# Patient Record
Sex: MALE | Race: WHITE | NOT HISPANIC OR LATINO | Employment: OTHER | URBAN - METROPOLITAN AREA
[De-identification: names, ages, dates, MRNs, and addresses within clinical notes are randomized per-mention and may not be internally consistent; named-entity substitution may affect disease eponyms.]

---

## 2017-06-14 ENCOUNTER — OFFICE VISIT (OUTPATIENT)
Dept: SPORTS MEDICINE | Facility: CLINIC | Age: 47
End: 2017-06-14
Payer: COMMERCIAL

## 2017-06-14 ENCOUNTER — HOSPITAL ENCOUNTER (OUTPATIENT)
Dept: RADIOLOGY | Facility: HOSPITAL | Age: 47
Discharge: HOME | End: 2017-06-14
Attending: ORTHOPAEDIC SURGERY
Payer: COMMERCIAL

## 2017-06-14 VITALS
HEART RATE: 49 BPM | SYSTOLIC BLOOD PRESSURE: 123 MMHG | WEIGHT: 175 LBS | HEIGHT: 72 IN | DIASTOLIC BLOOD PRESSURE: 70 MMHG | BODY MASS INDEX: 23.7 KG/M2

## 2017-06-14 DIAGNOSIS — M22.41 CHONDROMALACIA OF BOTH PATELLAE: ICD-10-CM

## 2017-06-14 DIAGNOSIS — M22.42 CHONDROMALACIA OF BOTH PATELLAE: ICD-10-CM

## 2017-06-14 DIAGNOSIS — M25.562 LEFT KNEE PAIN, UNSPECIFIED CHRONICITY: ICD-10-CM

## 2017-06-14 DIAGNOSIS — M25.562 LEFT KNEE PAIN, UNSPECIFIED CHRONICITY: Primary | ICD-10-CM

## 2017-06-14 PROCEDURE — 99203 OFFICE O/P NEW LOW 30 MIN: CPT | Mod: S$GLB,,, | Performed by: ORTHOPAEDIC SURGERY

## 2017-06-14 PROCEDURE — 73564 X-RAY EXAM KNEE 4 OR MORE: CPT | Mod: 26,50,, | Performed by: RADIOLOGY

## 2017-06-14 PROCEDURE — 73564 X-RAY EXAM KNEE 4 OR MORE: CPT | Mod: TC,50,PO

## 2017-06-14 PROCEDURE — 99999 PR PBB SHADOW E&M-NEW PATIENT-LVL IV: CPT | Mod: PBBFAC,,, | Performed by: ORTHOPAEDIC SURGERY

## 2017-06-14 RX ORDER — MELOXICAM 15 MG/1
15 TABLET ORAL DAILY
Qty: 30 TABLET | Refills: 2 | Status: SHIPPED | OUTPATIENT
Start: 2017-06-14 | End: 2018-04-26

## 2017-06-14 NOTE — PROGRESS NOTES
CC: Left knee pain    46 y.o. Male with a history of left knee pain who has had pain for a few years.  He states that the pain is severe and not responding to any conservative care.      He reports that the pain and weakness. It also bothers him at night. Works in a machine shop.      - mechanical symptoms, - instability    Is affecting ADLs.  Pain is 10/10 at it's worst.    No history of injections    REVIEW OF SYSTEMS:  Constitution: Negative. Negative for chills, fever and night sweats.   HENT: Negative for congestion and headaches.    Eyes: Negative for blurred vision, left vision loss and right vision loss.   Cardiovascular: Negative for chest pain and syncope.   Respiratory: Negative for cough and shortness of breath.    Endocrine: Negative for polydipsia, polyphagia and polyuria.   Hematologic/Lymphatic: Negative for bleeding problem. Does not bruise/bleed easily.   Skin: Negative for dry skin, itching and rash.   Musculoskeletal: Negative for falls. Positive for left knee pain and  muscle weakness.   Gastrointestinal: Negative for abdominal pain and bowel incontinence.   Genitourinary: Negative for bladder incontinence and nocturia.   Neurological: Negative for disturbances in coordination, loss of balance and seizures.   Psychiatric/Behavioral: Negative for depression. The patient does not have insomnia.    Allergic/Immunologic: Negative for hives and persistent infections.     PAST MEDICAL HISTORY:    Past Medical History:   Diagnosis Date    Allergy     Hyperlipidemia        PAST SURGICAL HISTORY:   Past Surgical History:   Procedure Laterality Date    SINUS SURGERY         FAMILY HISTORY:   Family History   Problem Relation Age of Onset    Arthritis Mother     Heart disease Maternal Grandmother     Heart disease Maternal Grandfather     Heart disease Paternal Grandmother     Heart disease Paternal Grandfather        SOCIAL HISTORY:   Social History     Social History    Marital status: Single      Spouse name: N/A    Number of children: N/A    Years of education: N/A     Occupational History    Not on file.     Social History Main Topics    Smoking status: Never Smoker    Smokeless tobacco: Not on file    Alcohol use 0.5 oz/week     1 Standard drinks or equivalent per week      Comment: very rarely    Drug use: No    Sexual activity: Yes     Partners: Female     Birth control/ protection: Condom     Other Topics Concern    Not on file     Social History Narrative    No narrative on file       MEDICATIONS:     Current Outpatient Prescriptions:     loratadine (CLARITIN) 10 mg tablet, Take 10 mg by mouth once daily.  , Disp: , Rfl:     ALLERGIES:   Review of patient's allergies indicates:  No Known Allergies    VITAL SIGNS:   /70   Pulse (!) 49   Ht 6' (1.829 m)   Wt 79.4 kg (175 lb)   BMI 23.73 kg/m²      PHYSICAL EXAMINATION  General:  The patient is alert and oriented x 3.  Mood is pleasant.  Observation of ears, eyes and nose reveal no gross abnormalities.  No labored breathing observed.    LEFT KNEE EXAMINATION     OBSERVATION / INSPECTION   Gait:   Nonantalgic   Alignment:  Neutral   Scars:   None   Muscle atrophy: Mild  Effusion:  None   Warmth:  None   Discoloration:   none     TENDERNESS / CREPITUS (T / C):          T / C      T / C   Patella   - / -   Lateral joint line   - / -   Peripatellar medial  -  Medial joint line    - / -   Peripatellar lateral -  Medial plica   - / -   Patellar tendon -   Popliteal fossa  - / -   Quad tendon   -   Gastrocnemius   -   Prepatellar Bursa - / -   Quadricep   -   Tibial tubercle  -  Thigh/hamstring  -   Pes anserine/HS -  Fibula    -   ITB   - / -  Tibia     -   Tib/fib joint  - / -  LCL    -     MFC   - / -   MCL: Proximal  -    LFC   - / -    Distal   -          ROM: (* = pain)  PASSIVE   ACTIVE    Left :   5 / 0 / 145   5 / 0 / 145     Right :    5 / 0 / 145   5 / 0 / 145    Patellofemoral examination:  See above noted areas of  tenderness.   Patella position    Subluxation / dislocation: Centered           Sup. / Inf;   Normal   Crepitus (PF):    Absent   Patellar Mobility:       Medial-lateral:   Normal    Superior-inferior:  Normal    Inferior tilt   Normal    Patellar tendon:  Normal   Lateral tilt:    Normal   J-sign:     None   Patellofemoral grind:   No pain       MENISCAL SIGNS:     Pain on terminal extension:  -  Pain on terminal flexion:  -  Geoffs maneuver:  - (for pain)  Squat     - (for pain)    LIGAMENT EXAMINATION:  ACL / Lachman:  normal (-1 to 2mm)    PCL-Post.  drawer: normal 0 to 2mm  MCL- Valgus:  normal 0 to 2mm  LCL- Varus:  normal 0 to 2mm  Pivot shift: normal (Equal)   Dial Test: difference c/w other side   At 30° flexion: normal (< 5°)    At 90° flexion: normal (< 5°)   Reverse Pivot Shift:   normal (Equal)     STRENGTH: (* = with pain) PAINFUL SIDE   Quadricep   5/5   Hamstrin/5    EXTREMITY NEURO-VASCULAR EXAMINATION:   Sensation:  Grossly intact to light touch all dermatomal regions.   Motor Function:  Fully intact motor function at hip, knee, foot and ankle    DTRs;  quadriceps and  achilles 2+.  No clonus and downgoing Babinski.    Vascular status:  DP and PT pulses 2+, brisk capillary refill, symmetric.     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 Mobic  2. PT  3. RTC 2-3mo. If no improvements will consider synvisc injection    All questions were answered, pt will contact us for questions or concerns in the interim.

## 2017-07-05 ENCOUNTER — CLINICAL SUPPORT (OUTPATIENT)
Dept: REHABILITATION | Facility: HOSPITAL | Age: 47
End: 2017-07-05
Attending: ORTHOPAEDIC SURGERY
Payer: COMMERCIAL

## 2017-07-05 DIAGNOSIS — M25.562 CHRONIC PAIN OF LEFT KNEE: Primary | ICD-10-CM

## 2017-07-05 DIAGNOSIS — G89.29 CHRONIC PAIN OF LEFT KNEE: Primary | ICD-10-CM

## 2017-07-05 PROCEDURE — 97110 THERAPEUTIC EXERCISES: CPT

## 2017-07-05 PROCEDURE — 97161 PT EVAL LOW COMPLEX 20 MIN: CPT

## 2017-07-05 NOTE — PROGRESS NOTES
[facility]   PATIENT EVALUATION    Date: 07/05/2017  Start Time: 8:30  Stop Time: 9:30  Visit #:    Patient Name: Jonathan Srinivasan  Clinic Number: [id number]  Age: 46 y.o.  Gender: male    Diagnosis:   Encounter Diagnosis   Name Primary?    Chronic pain of left knee Yes       Referring Physician: Lino Canchola*  Treatment Orders: PT Eval and Treat      History     Past Medical History:   Diagnosis Date    Allergy     Hyperlipidemia        Current Outpatient Prescriptions   Medication Sig    loratadine (CLARITIN) 10 mg tablet Take 10 mg by mouth once daily.      meloxicam (MOBIC) 15 MG tablet Take 1 tablet (15 mg total) by mouth once daily.     No current facility-administered medications for this visit.        Review of patient's allergies indicates:  No Known Allergies      Subjective     History of Present Condition: Pt reports that pain has been around for years. He states that L knee is worse than R knee. No traumatic injury.   Pt reports pain with climbing stairs, squatting. Denies N/T    Onset Date: chronic  Date of Surgery: NA  Precautions: NA    Mechanism of Injury: insidious    Pain Location: knee   Pain Description: Sharp  Current Pain: 0/10  Least Pain: 0/10  Worst Pain: 10/10  Aggravating Factors: stairs, squats  Relieving Factors: rest    Diagnostic Tests: Right: No fracture dislocation bone destruction or OCD seen.  There is mild DJD.    Left: No fracture dislocation bone destruction or OCD seen.  There is mild DJD.  Prior Therapy: Yes - 1 mo 2-3 years ago    Occupation:   Job Status: working    Sports/Recreational Activities: working out  Extremity Dominance: R    Prior Level of Function: Independent  Functional Deficits Leading to Referral/Nature of Injury: pain with ambulation and stairs  Patient Therapy Goals: return to work and stair climbing pain free  Cultural/Environmental/Spiritual Barriers to Treatment or Learning: none      Objective      Observation: Pt arrived without AD or brace    Palpation: ttp over med/lat fat pads    Range of Motion: ROM equal and WNL B     Right Knee  Flexion:  Extension:    Left Knee  Flexion:  Extension:    Patella Mobility:   Right Knee:normal    Left Knee: normal      Flexibility:   Hamstring  Hip Flexor  Quad  GSS  +ITB    Strength: strength 5/5 B for all hip and knee tests  Right Hip  Flexion:   Extension:  Abduction:    Left Hip  Flexion:  Extension:   Abduction:    Right Knee  Flexion:  Extension:    Left Knee  Flexion:  Extension:    Special Tests: negative mechanical signs and negative instability    Other: Pt demonstrated poor step down pattern and exhibited valgus collapse; poor glut med activation in functional activity    Treatment:     SL bridges 3 x 10  Clamshells 3 x 10  kickouts 3 x 10      Functional Limitations Reports - G Codes  Category: Mobility  Tool: FOTO  Score: 55       Assessment     History  Co-morbidities and personal factors that may impact the plan of care Examination  Body Structures and Functions, activity limitations and participation restrictions that may impact the plan of care Clinical Presentation   Decision Making/ Complexity Score   Co-morbidities:                 Personal Factors:    Body Regions:    Body Systems:           Activity limitations:       Participation Restrictions:        stable low         This is a 46 y.o. male referred to outpatient physical therapy and presents with a medical diagnosis of knee pain and demonstrates limitations as described in the problem list. Pt demonstrates good rehab potential. Pt will benefit from physcial therapy services in order to maximize pain free and/or functional use of left knee. The following goals were discussed with the patient and patient is in agreement with them as to be addressed in the treatment plan. Pt was given a HEP consisting of     SL bridges 3 x 10  Clamshells 3 x 10  kickouts 3 x 10    . Pt verbally understood the  "instructions as they were given and demonstrated proper form and technique during therapy. Pt was advised to perform these exercises free of pain, and to stop performing them if pain occurs.     Medical necessity is demonstrated by the following problem list:   - Pain limits function of effected part for all activities  - Unable to participate in daily activities   - Requires skilled supervision to complete and progress HEP  - Fall risk - impaired balance   - Continued inability to participate in vocational pursuits    Short Term Goals (4-5 Weeks):  - Decrease Pain to 0 with climbing 1 flights of stairs  - Pt to self correct posture with 6" step down to demonstrate improved hip activation   - Pt independent with HEP with progressions.     Long Term Goals (8-10 Weeks):  - Decrease Pain to 0 with climbing 1 flights of stairs  - Pt to self correct posture with 30 SLS to demonstrate improved hip activcvation and endurance  - Pt to return to work with no increase in s/s during the day      Plan     Pt will be treated by physical therapy 1-2 times a week for 8-10 weeks for manual therapy, therapeutic exercise, home exercise program, patient education, and modalities PRN to achieve established goals. Jonathan may at times be seen by a PTA as part of the Rehab Team.     Daya León, PT, DPT  07/05/2017    I CERTIFY THE NEED FOR THESE SERVICES FURNISHED UNDER THIS PLAN OF TREATMENT AND WHILE UNDER MY CAR  Physician's comments: _____________________________________________________________________________________________________________________    Physician's Name: ___________________________________            "

## 2017-07-10 ENCOUNTER — CLINICAL SUPPORT (OUTPATIENT)
Dept: REHABILITATION | Facility: HOSPITAL | Age: 47
End: 2017-07-10
Attending: ORTHOPAEDIC SURGERY
Payer: COMMERCIAL

## 2017-07-10 DIAGNOSIS — M25.562 CHRONIC PAIN OF LEFT KNEE: Primary | ICD-10-CM

## 2017-07-10 DIAGNOSIS — G89.29 CHRONIC PAIN OF LEFT KNEE: Primary | ICD-10-CM

## 2017-07-10 PROCEDURE — 97110 THERAPEUTIC EXERCISES: CPT

## 2017-07-10 NOTE — PLAN OF CARE
[facility]   PATIENT EVALUATION    Date: 07/05/2017  Start Time: 8:30  Stop Time: 9:30  Visit #:    Patient Name: Jonathan Srinivasan  Clinic Number: [id number]  Age: 46 y.o.  Gender: male    Diagnosis:   Encounter Diagnosis   Name Primary?    Chronic pain of left knee Yes       Referring Physician: Lino Canchola*  Treatment Orders: PT Eval and Treat      History     Past Medical History:   Diagnosis Date    Allergy     Hyperlipidemia        Current Outpatient Prescriptions   Medication Sig    loratadine (CLARITIN) 10 mg tablet Take 10 mg by mouth once daily.      meloxicam (MOBIC) 15 MG tablet Take 1 tablet (15 mg total) by mouth once daily.     No current facility-administered medications for this visit.        Review of patient's allergies indicates:  No Known Allergies      Subjective     History of Present Condition: Pt reports that pain has been around for years. He states that L knee is worse than R knee. No traumatic injury.   Pt reports pain with climbing stairs, squatting. Denies N/T    Onset Date: chronic  Date of Surgery: NA  Precautions: NA    Mechanism of Injury: insidious    Pain Location: knee   Pain Description: Sharp  Current Pain: 0/10  Least Pain: 0/10  Worst Pain: 10/10  Aggravating Factors: stairs, squats  Relieving Factors: rest    Diagnostic Tests: Right: No fracture dislocation bone destruction or OCD seen.  There is mild DJD.    Left: No fracture dislocation bone destruction or OCD seen.  There is mild DJD.  Prior Therapy: Yes - 1 mo 2-3 years ago    Occupation:   Job Status: working    Sports/Recreational Activities: working out  Extremity Dominance: R    Prior Level of Function: Independent  Functional Deficits Leading to Referral/Nature of Injury: pain with ambulation and stairs  Patient Therapy Goals: return to work and stair climbing pain free  Cultural/Environmental/Spiritual Barriers to Treatment or Learning: none      Objective      Observation: Pt arrived without AD or brace    Palpation: ttp over med/lat fat pads    Range of Motion: ROM equal and WNL B     Right Knee  Flexion:  Extension:    Left Knee  Flexion:  Extension:    Patella Mobility:   Right Knee:normal    Left Knee: normal      Flexibility:   Hamstring  Hip Flexor  Quad  GSS  +ITB    Strength: strength 5/5 B for all hip and knee tests  Right Hip  Flexion:   Extension:  Abduction:    Left Hip  Flexion:  Extension:   Abduction:    Right Knee  Flexion:  Extension:    Left Knee  Flexion:  Extension:    Special Tests: negative mechanical signs and negative instability    Other: Pt demonstrated poor step down pattern and exhibited valgus collapse; poor glut med activation in functional activity    Treatment:     SL bridges 3 x 10  Clamshells 3 x 10  kickouts 3 x 10      Functional Limitations Reports - G Codes  Category: Mobility  Tool: FOTO  Score: 55       Assessment     History  Co-morbidities and personal factors that may impact the plan of care Examination  Body Structures and Functions, activity limitations and participation restrictions that may impact the plan of care Clinical Presentation   Decision Making/ Complexity Score   Co-morbidities:                 Personal Factors:    Body Regions:    Body Systems:           Activity limitations:       Participation Restrictions:        stable low         This is a 46 y.o. male referred to outpatient physical therapy and presents with a medical diagnosis of knee pain and demonstrates limitations as described in the problem list. Pt demonstrates good rehab potential. Pt will benefit from physcial therapy services in order to maximize pain free and/or functional use of left knee. The following goals were discussed with the patient and patient is in agreement with them as to be addressed in the treatment plan. Pt was given a HEP consisting of     SL bridges 3 x 10  Clamshells 3 x 10  kickouts 3 x 10    . Pt verbally understood the  "instructions as they were given and demonstrated proper form and technique during therapy. Pt was advised to perform these exercises free of pain, and to stop performing them if pain occurs.     Medical necessity is demonstrated by the following problem list:   - Pain limits function of effected part for all activities  - Unable to participate in daily activities   - Requires skilled supervision to complete and progress HEP  - Fall risk - impaired balance   - Continued inability to participate in vocational pursuits    Short Term Goals (4-5 Weeks):  - Decrease Pain to 0 with climbing 1 flights of stairs  - Pt to self correct posture with 6" step down to demonstrate improved hip activation   - Pt independent with HEP with progressions.     Long Term Goals (8-10 Weeks):  - Decrease Pain to 0 with climbing 1 flights of stairs  - Pt to self correct posture with 30 SLS to demonstrate improved hip activcvation and endurance  - Pt to return to work with no increase in s/s during the day      Plan     Pt will be treated by physical therapy 1-2 times a week for 8-10 weeks for manual therapy, therapeutic exercise, home exercise program, patient education, and modalities PRN to achieve established goals. Jonathan may at times be seen by a PTA as part of the Rehab Team.     Daya León, PT, DPT  07/05/2017    I CERTIFY THE NEED FOR THESE SERVICES FURNISHED UNDER THIS PLAN OF TREATMENT AND WHILE UNDER MY CAR  Physician's comments: _____________________________________________________________________________________________________________________    Physician's Name: ___________________________________      "

## 2017-07-10 NOTE — PROGRESS NOTES
"                                                  Physical Therapy Daily Note     Date: 07/10/2017  Name: Jonathan Srinivasan  Clinic Number: [id number]  Diagnosis:   Encounter Diagnosis   Name Primary?    Chronic pain of left knee Yes     Physician: Lino Canchola*    Evaluation Date: 7/5  Date of Surgery:   Return to MD Date:   Visit #: 2 of   Start Time:  4:50  Stop Time:  5:50  Total Treatment Time: 60       Subjective     Pt reports that he feels about the same     Pain: not specified    Objective     Measurements taken:     Patient received individual therapy to increase strength, endurance, ROM, flexibility, posture and core stabilization with activities as follows:     Jonathan received therapeutic exercises to develop strength, endurance, ROM, flexibility, posture and core stabilization for 45 minutes including:        SL bridges 3 x 10  Clamshells 3 x 10  kickouts 3 x 10  LLR 3 x 10  EOT lunges 3 x 10  Wall sits 3 x 30 sec  6" step downs with balance assist x 30          Jonathan received the following manual therapy techniques: Joint mobilizations were applied to the:  for 0 minutes.     TWritten Home Exercises Provided: Yes  Pt demo good understanding of the education provided. Jonathan demonstrated good return demonstration of activities.     Education provided:  Jonathan verbalized good understanding of education provided.   No spiritual or educational barriers to learning identified.    Assessment     Pt reported pain with SL therex. Pt unable to compelte 6" step down. Cont to progress NM control of hip to prevent valgus during SL activity.     This is a 46 y.o. male referred to outpatient physical therapy and presents with a medical diagnosis of knee pain and demonstrates limitations as described in the problem list Pt prognosis is Good. Pt will continue to benefit from skilled outpatient physical therapy to address the deficits listed below in the problem list, provide pt/family education and to " maximize pt's level of independence in the home and community environment.    Will the patient continue to benefit from skilled PT intervention? Yes        Medical necessity is demonstrated by:   - Pain limits function of effected part for all activities  - Unable to participate in daily activities   - Requires skilled supervision to complete and progress HEP  - Fall risk - impaired balance   - Continued inability to participate in vocational pursuits    Progress towards goals:     New/Revised Goals:       Plan   Continue with established Plan of Care towards PT goals.      Daya León PT, DPT  07/10/2017

## 2017-07-17 ENCOUNTER — CLINICAL SUPPORT (OUTPATIENT)
Dept: REHABILITATION | Facility: HOSPITAL | Age: 47
End: 2017-07-17
Attending: ORTHOPAEDIC SURGERY
Payer: COMMERCIAL

## 2017-07-17 DIAGNOSIS — M25.562 ACUTE PAIN OF LEFT KNEE: Primary | ICD-10-CM

## 2017-07-17 PROCEDURE — 97110 THERAPEUTIC EXERCISES: CPT

## 2017-07-17 NOTE — PROGRESS NOTES
"                                                  Physical Therapy Daily Note     Diagnosis:   Encounter Diagnosis   Name Primary?    Acute pain of left knee Yes     Physician: Lino Canchola*    Evaluation Date: 7/5  Date of Surgery:   Return to MD Date:   Visit #: 3  Total Treatment Time: 60       Subjective     Pt states having pain on his knee with some motions only and going down steps.     Objective     Measurements taken:     Patient received individual therapy to increase strength, endurance, ROM, flexibility, posture and core stabilization with activities as follows:     Jonathan received therapeutic exercises to develop strength, endurance, ROM, flexibility, posture and core stabilization for 45 minutes including:   Stationary bike 10 minutes   Supine hip flexor/quad stretch 3 x 15 reps - done by PTA   SL bridges 3 x 10  Clamshells 3 x 10  kickouts 3 x 10  LLR 3 x 10  stationary lunges 2 x 20 reps - re bound   Wall sits 3 x 30 sec - foam   6" step downs with balance assist x 30 (NP)   ER in supine with gtb 2 x 15 reps   IR 2.5# 2 x 15 reps   LAQ eccentric contraction B LEs 10 reps each     Jonathan received the following manual therapy techniques: Joint mobilizations were applied to the:  for 0 minutes.   TWritten Home Exercises Provided: Yes  Pt demo good understanding of the education provided. Jonathan demonstrated good return demonstration of activities.     Education provided:  Jonathan verbalized good understanding of education provided.   No spiritual or educational barriers to learning identified.    Assessment   Pt with good tolerance to PT today, mild discomfort on knee with flexion of knee but resolved.   This is a 46 y.o. male referred to outpatient physical therapy and presents with a medical diagnosis of knee pain and demonstrates limitations as described in the problem list Pt prognosis is Good. Pt will continue to benefit from skilled outpatient physical therapy to address the " deficits listed below in the problem list, provide pt/family education and to maximize pt's level of independence in the home and community environment.    Will the patient continue to benefit from skilled PT intervention? Yes        Medical necessity is demonstrated by:   - Pain limits function of effected part for all activities  - Unable to participate in daily activities   - Requires skilled supervision to complete and progress HEP  - Fall risk - impaired balance   - Continued inability to participate in vocational pursuits    Progress towards goals:good     New/Revised Goals:remain appropriated.        Plan   Continue with established Plan of Care towards PT goals. PT/PTA face-to-face:discussed Pt's POC and progress towards established PT goals.  Domonique Weeks, PTA  Daya León, PT, DPT  07/17/2017

## 2017-07-25 ENCOUNTER — CLINICAL SUPPORT (OUTPATIENT)
Dept: REHABILITATION | Facility: HOSPITAL | Age: 47
End: 2017-07-25
Attending: ORTHOPAEDIC SURGERY
Payer: COMMERCIAL

## 2017-07-25 DIAGNOSIS — M25.562 ACUTE PAIN OF LEFT KNEE: Primary | ICD-10-CM

## 2017-07-25 PROCEDURE — 97110 THERAPEUTIC EXERCISES: CPT

## 2017-07-25 NOTE — PROGRESS NOTES
"                                                  Physical Therapy Daily Note     Diagnosis:   Encounter Diagnosis   Name Primary?    Acute pain of left knee Yes     Physician: Lino Canchola*    Evaluation Date: 7/5  Date of Surgery:   Return to MD Date:   Visit #: 4  7:55 - 8:55  Total Treatment Time: 60       Subjective      Pt reports that he is having pain on less occasions    Objective     Measurements taken:     Patient received individual therapy to increase strength, endurance, ROM, flexibility, posture and core stabilization with activities as follows:     Jonathan received therapeutic exercises to develop strength, endurance, ROM, flexibility, posture and core stabilization for 45 minutes including:   Stationary bike 10 minutes   SL bridges 3 x 10  Clamshells 3 x 10  kickouts 3 x 10  6" step downs  x 30   Lunge walks x 2 laps  SL Wall sits 3 x 30 sec       Jonathan received the following manual therapy techniques: Joint mobilizations were applied to the:  for 0 minutes.   TWritten Home Exercises Provided: Yes  Pt demo good understanding of the education provided. Jonathan demonstrated good return demonstration of activities.     Education provided:  Jonathan verbalized good understanding of education provided.   No spiritual or educational barriers to learning identified.    Assessment   Pt had discomfort following 2 laps of lunges, but resolved with d/c of exercise. No linge pain noted. Pt had no pain at end of PT. Cont functional exercise training.     This is a 46 y.o. male referred to outpatient physical therapy and presents with a medical diagnosis of knee pain and demonstrates limitations as described in the problem list Pt prognosis is Good. Pt will continue to benefit from skilled outpatient physical therapy to address the deficits listed below in the problem list, provide pt/family education and to maximize pt's level of independence in the home and community environment.    Will the " patient continue to benefit from skilled PT intervention? Yes        Medical necessity is demonstrated by:   - Pain limits function of effected part for all activities  - Unable to participate in daily activities   - Requires skilled supervision to complete and progress HEP  - Fall risk - impaired balance   - Continued inability to participate in vocational pursuits    Progress towards goals:good     New/Revised Goals:remain appropriated.        Plan   Continue with established Plan of Care towards PT goals. PT/PTA face-to-face:discussed Pt's POC and progress towards established PT goals.  Autumn León, JULIANA León PT, DPT  07/25/2017

## 2017-08-17 ENCOUNTER — CLINICAL SUPPORT (OUTPATIENT)
Dept: REHABILITATION | Facility: HOSPITAL | Age: 47
End: 2017-08-17
Attending: ORTHOPAEDIC SURGERY
Payer: COMMERCIAL

## 2017-08-17 DIAGNOSIS — M25.562 ACUTE PAIN OF LEFT KNEE: Primary | ICD-10-CM

## 2017-08-17 PROCEDURE — 97110 THERAPEUTIC EXERCISES: CPT

## 2017-08-17 NOTE — PROGRESS NOTES
"                                                  Physical Therapy Daily Note     Diagnosis:   Encounter Diagnosis   Name Primary?    Acute pain of left knee Yes     Physician: Lino Canchola*    Evaluation Date: 7/5  Date of Surgery:   Return to MD Date:   Visit #: 5  1:00 - 2:00  Total Treatment Time: 60       Subjective      Pt reports that he is having pain on less occasions    Objective     Measurements taken:     PT reviewed FOTO scores for Jonathan Srinivasan on 8/17  FOTO score: 62      Patient received individual therapy to increase strength, endurance, ROM, flexibility, posture and core stabilization with activities as follows:     Jonathan received therapeutic exercises to develop strength, endurance, ROM, flexibility, posture and core stabilization for 45 minutes including:   Stationary bike 10 minutes   SL bridges 3 x 10  Clamshells 3 x 10  kickouts 3 x 10  6" step downs  x 30   Lunge walks x 2 laps  SL Wall sits 3 x 30 sec     PF tape Corona      Jonathan received the following manual therapy techniques: Joint mobilizations were applied to the:  for 0 minutes.   TWritten Home Exercises Provided: Yes  Pt demo good understanding of the education provided. Jonathan demonstrated good return demonstration of activities.     Education provided:  Jonathan verbalized good understanding of education provided.   No spiritual or educational barriers to learning identified.    Assessment   Pt reported discomfort only at end of lunge walkings. Pt had little discomfort with Corona taping, but tape failed to remain on. Pt demonstrated mild valgus, but reported that he felt "unbalance" cont balance training and hip NM control.     This is a 47 y.o. male referred to outpatient physical therapy and presents with a medical diagnosis of knee pain and demonstrates limitations as described in the problem list Pt prognosis is Good. Pt will continue to benefit from skilled outpatient physical therapy to address the " deficits listed below in the problem list, provide pt/family education and to maximize pt's level of independence in the home and community environment.    Will the patient continue to benefit from skilled PT intervention? Yes        Medical necessity is demonstrated by:   - Pain limits function of effected part for all activities  - Unable to participate in daily activities   - Requires skilled supervision to complete and progress HEP  - Fall risk - impaired balance   - Continued inability to participate in vocational pursuits    Progress towards goals:good     New/Revised Goals:remain appropriated.        Plan   Continue with established Plan of Care towards PT goals. PT/PTA face-to-face:discussed Pt's POC and progress towards established PT goals.  Autumn León, PT  Daya León, PT, DPT  08/17/2017

## 2017-08-23 ENCOUNTER — CLINICAL SUPPORT (OUTPATIENT)
Dept: REHABILITATION | Facility: HOSPITAL | Age: 47
End: 2017-08-23
Attending: ORTHOPAEDIC SURGERY
Payer: COMMERCIAL

## 2017-08-23 DIAGNOSIS — M25.562 ACUTE PAIN OF LEFT KNEE: Primary | ICD-10-CM

## 2017-08-23 PROCEDURE — 97110 THERAPEUTIC EXERCISES: CPT

## 2017-08-23 NOTE — PROGRESS NOTES
"                                                  Physical Therapy Daily Note     Diagnosis:   Encounter Diagnosis   Name Primary?    Acute pain of left knee Yes     Physician: Lino Canchola*    Evaluation Date: 7/5  Date of Surgery:   Return to MD Date:   Visit #: 6  1:30 - 2:30  Total Treatment Time: 60       Subjective      Pt reports that he was hurting all weekend     Objective     Measurements taken:     Patient received individual therapy to increase strength, endurance, ROM, flexibility, posture and core stabilization with activities as follows:     Jonathan received therapeutic exercises to develop strength, endurance, ROM, flexibility, posture and core stabilization for 45 minutes including:   Stationary bike 10 minutes   SL bridges 3 x 10  Clamshells 3 x 10  kickouts 3 x 10  6" step downs  x 30   Lunge walks x 2 laps  SL Wall sits 3 x 30 sec       Jonathan received the following manual therapy techniques: Joint mobilizations were applied to the:  for 0 minutes.   TWritten Home Exercises Provided: Yes  Pt demo good understanding of the education provided. Jonathan demonstrated good return demonstration of activities.     Education provided:  Jonathan verbalized good understanding of education provided.   No spiritual or educational barriers to learning identified.    Assessment   Pt had discomfort following 2 laps of lunges, but resolved with d/c of exercise. No linge pain noted. Pt had no pain at end of PT. Cont functional exercise training.     This is a 47 y.o. male referred to outpatient physical therapy and presents with a medical diagnosis of knee pain and demonstrates limitations as described in the problem list Pt prognosis is Good. Pt will continue to benefit from skilled outpatient physical therapy to address the deficits listed below in the problem list, provide pt/family education and to maximize pt's level of independence in the home and community environment.    Will the patient " continue to benefit from skilled PT intervention? Yes        Medical necessity is demonstrated by:   - Pain limits function of effected part for all activities  - Unable to participate in daily activities   - Requires skilled supervision to complete and progress HEP  - Fall risk - impaired balance   - Continued inability to participate in vocational pursuits    Progress towards goals:good     New/Revised Goals:remain appropriated.        Plan   Continue with established Plan of Care towards PT goals. PT/PTA face-to-face:discussed Pt's POC and progress towards established PT goals.  Autumn León, JULIANA León PT, DPT  08/23/2017

## 2019-07-17 ENCOUNTER — TELEPHONE (OUTPATIENT)
Dept: OTOLARYNGOLOGY | Facility: CLINIC | Age: 49
End: 2019-07-17

## 2019-07-17 NOTE — TELEPHONE ENCOUNTER
Returned pt call. Pt sister states they found a surgeon elsewhere.      ----- Message from JULIO Kumari MD sent at 7/16/2019  5:47 PM CDT -----  Contact: Shalonda Fang  I do perform this type of surgery.  Please have the patient come in on Thursday.  ----- Message -----  From: Debbie Andersen LPN  Sent: 7/16/2019   1:31 PM  To: JULIO Kumari MD        ----- Message -----  From: Beatriz eHnson  Sent: 7/16/2019  10:49 AM  To: Quoc Kevin Staff    Name of Who is Calling: Shalonda Fang      What is the request in detail: Jeannette would like to know if provider can repair an open fracture of left orbital floor and closed fracture of nasal bone. Please contact to further discuss and advise.       Can the clinic reply by [facility]: N       What Number to Call Back [facility]: [phone]

## 2020-09-14 DIAGNOSIS — G47.33 OBSTRUCTIVE SLEEP APNEA: Primary | ICD-10-CM

## 2020-09-14 DIAGNOSIS — R53.83 FATIGUE: ICD-10-CM

## 2020-09-14 DIAGNOSIS — G25.81 RESTLESS LEG: ICD-10-CM

## 2020-09-14 DIAGNOSIS — R06.83 SNORING: ICD-10-CM

## 2020-09-21 ENCOUNTER — PROCEDURE VISIT (OUTPATIENT)
Dept: SLEEP MEDICINE | Facility: HOSPITAL | Age: 50
End: 2020-09-21
Attending: INTERNAL MEDICINE
Payer: COMMERCIAL

## 2020-09-21 DIAGNOSIS — R53.83 FATIGUE: ICD-10-CM

## 2020-09-21 DIAGNOSIS — R06.83 SNORING: ICD-10-CM

## 2020-09-21 DIAGNOSIS — G25.81 RESTLESS LEG: ICD-10-CM

## 2020-09-21 DIAGNOSIS — G47.33 OBSTRUCTIVE SLEEP APNEA: ICD-10-CM

## 2020-09-21 PROCEDURE — 95806 SLEEP STUDY UNATT&RESP EFFT: CPT

## 2020-09-28 DIAGNOSIS — G47.9 FATIGUE DUE TO SLEEP PATTERN DISTURBANCE: ICD-10-CM

## 2020-09-28 DIAGNOSIS — G47.19 EXCESSIVE DAYTIME SLEEPINESS: ICD-10-CM

## 2020-09-28 DIAGNOSIS — R06.83 SNORING: ICD-10-CM

## 2020-09-28 DIAGNOSIS — Z01.818 OTHER SPECIFIED PRE-OPERATIVE EXAMINATION: Primary | ICD-10-CM

## 2020-09-28 DIAGNOSIS — G47.33 OSA (OBSTRUCTIVE SLEEP APNEA): ICD-10-CM

## 2020-09-28 DIAGNOSIS — R53.83 FATIGUE DUE TO SLEEP PATTERN DISTURBANCE: ICD-10-CM

## 2020-10-11 ENCOUNTER — LAB VISIT (OUTPATIENT)
Dept: PRIMARY CARE CLINIC | Facility: CLINIC | Age: 50
End: 2020-10-11
Attending: INTERNAL MEDICINE
Payer: COMMERCIAL

## 2020-10-11 DIAGNOSIS — Z01.818 OTHER SPECIFIED PRE-OPERATIVE EXAMINATION: ICD-10-CM

## 2020-10-11 PROCEDURE — U0003 INFECTIOUS AGENT DETECTION BY NUCLEIC ACID (DNA OR RNA); SEVERE ACUTE RESPIRATORY SYNDROME CORONAVIRUS 2 (SARS-COV-2) (CORONAVIRUS DISEASE [COVID-19]), AMPLIFIED PROBE TECHNIQUE, MAKING USE OF HIGH THROUGHPUT TECHNOLOGIES AS DESCRIBED BY CMS-2020-01-R: HCPCS

## 2020-10-12 LAB — SARS-COV-2 RNA RESP QL NAA+PROBE: NOT DETECTED

## 2020-10-14 ENCOUNTER — PROCEDURE VISIT (OUTPATIENT)
Dept: SLEEP MEDICINE | Facility: HOSPITAL | Age: 50
End: 2020-10-14
Attending: INTERNAL MEDICINE
Payer: COMMERCIAL

## 2020-10-14 DIAGNOSIS — G47.9 FATIGUE DUE TO SLEEP PATTERN DISTURBANCE: ICD-10-CM

## 2020-10-14 DIAGNOSIS — G47.33 OSA (OBSTRUCTIVE SLEEP APNEA): ICD-10-CM

## 2020-10-14 DIAGNOSIS — G47.19 EXCESSIVE DAYTIME SLEEPINESS: ICD-10-CM

## 2020-10-14 DIAGNOSIS — R06.83 SNORING: ICD-10-CM

## 2020-10-14 DIAGNOSIS — R53.83 FATIGUE DUE TO SLEEP PATTERN DISTURBANCE: ICD-10-CM

## 2020-10-14 PROCEDURE — 95810 POLYSOM 6/> YRS 4/> PARAM: CPT

## 2021-04-01 ENCOUNTER — TELEPHONE (OUTPATIENT)
Dept: ADMINISTRATIVE | Facility: OTHER | Age: 51
End: 2021-04-01

## 2021-04-15 ENCOUNTER — OFFICE VISIT (OUTPATIENT)
Dept: PRIMARY CARE CLINIC | Facility: CLINIC | Age: 51
End: 2021-04-15
Payer: COMMERCIAL

## 2021-04-15 VITALS
RESPIRATION RATE: 18 BRPM | SYSTOLIC BLOOD PRESSURE: 128 MMHG | TEMPERATURE: 98 F | HEART RATE: 82 BPM | BODY MASS INDEX: 25.08 KG/M2 | HEIGHT: 72 IN | OXYGEN SATURATION: 99 % | WEIGHT: 185.19 LBS | DIASTOLIC BLOOD PRESSURE: 70 MMHG

## 2021-04-15 DIAGNOSIS — Z13.6 ENCOUNTER FOR SCREENING FOR CARDIOVASCULAR DISORDERS: ICD-10-CM

## 2021-04-15 DIAGNOSIS — Z76.89 ENCOUNTER TO ESTABLISH CARE: Primary | ICD-10-CM

## 2021-04-15 DIAGNOSIS — R42 DIZZINESS: ICD-10-CM

## 2021-04-15 DIAGNOSIS — R53.83 FATIGUE, UNSPECIFIED TYPE: ICD-10-CM

## 2021-04-15 DIAGNOSIS — Z12.5 PROSTATE CANCER SCREENING: ICD-10-CM

## 2021-04-15 DIAGNOSIS — Z00.00 ROUTINE PHYSICAL EXAMINATION: ICD-10-CM

## 2021-04-15 PROBLEM — M22.42 CHONDROMALACIA OF BOTH PATELLAE: Status: RESOLVED | Noted: 2017-06-14 | Resolved: 2021-04-15

## 2021-04-15 PROBLEM — M22.41 CHONDROMALACIA OF BOTH PATELLAE: Status: RESOLVED | Noted: 2017-06-14 | Resolved: 2021-04-15

## 2021-04-15 PROBLEM — M25.562 LEFT KNEE PAIN: Status: RESOLVED | Noted: 2017-06-14 | Resolved: 2021-04-15

## 2021-04-15 PROCEDURE — 99999 PR PBB SHADOW E&M-EST. PATIENT-LVL IV: CPT | Mod: PBBFAC,,, | Performed by: NURSE PRACTITIONER

## 2021-04-15 PROCEDURE — 99204 OFFICE O/P NEW MOD 45 MIN: CPT | Mod: S$GLB,,, | Performed by: NURSE PRACTITIONER

## 2021-04-15 PROCEDURE — 1126F PR PAIN SEVERITY QUANTIFIED, NO PAIN PRESENT: ICD-10-PCS | Mod: S$GLB,,, | Performed by: NURSE PRACTITIONER

## 2021-04-15 PROCEDURE — 93010 EKG 12-LEAD: ICD-10-PCS | Mod: S$GLB,,, | Performed by: INTERNAL MEDICINE

## 2021-04-15 PROCEDURE — 3008F PR BODY MASS INDEX (BMI) DOCUMENTED: ICD-10-PCS | Mod: CPTII,S$GLB,, | Performed by: NURSE PRACTITIONER

## 2021-04-15 PROCEDURE — 99999 PR PBB SHADOW E&M-EST. PATIENT-LVL IV: ICD-10-PCS | Mod: PBBFAC,,, | Performed by: NURSE PRACTITIONER

## 2021-04-15 PROCEDURE — 1126F AMNT PAIN NOTED NONE PRSNT: CPT | Mod: S$GLB,,, | Performed by: NURSE PRACTITIONER

## 2021-04-15 PROCEDURE — 99204 PR OFFICE/OUTPT VISIT, NEW, LEVL IV, 45-59 MIN: ICD-10-PCS | Mod: S$GLB,,, | Performed by: NURSE PRACTITIONER

## 2021-04-15 PROCEDURE — 93005 ELECTROCARDIOGRAM TRACING: CPT | Mod: S$GLB,,, | Performed by: NURSE PRACTITIONER

## 2021-04-15 PROCEDURE — 93005 EKG 12-LEAD: ICD-10-PCS | Mod: S$GLB,,, | Performed by: NURSE PRACTITIONER

## 2021-04-15 PROCEDURE — 3008F BODY MASS INDEX DOCD: CPT | Mod: CPTII,S$GLB,, | Performed by: NURSE PRACTITIONER

## 2021-04-15 PROCEDURE — 93010 ELECTROCARDIOGRAM REPORT: CPT | Mod: S$GLB,,, | Performed by: INTERNAL MEDICINE

## 2021-04-15 RX ORDER — GABAPENTIN 300 MG/1
1 CAPSULE ORAL NIGHTLY
COMMUNITY
Start: 2021-03-29

## 2021-04-15 RX ORDER — BUPROPION HYDROCHLORIDE 150 MG/1
1 TABLET ORAL DAILY
COMMUNITY
Start: 2021-04-01 | End: 2021-07-09

## 2021-04-15 RX ORDER — TEMAZEPAM 15 MG/1
1 CAPSULE ORAL NIGHTLY
COMMUNITY
Start: 2021-01-20 | End: 2022-08-16 | Stop reason: SDUPTHER

## 2021-07-09 ENCOUNTER — OFFICE VISIT (OUTPATIENT)
Dept: PRIMARY CARE CLINIC | Facility: CLINIC | Age: 51
End: 2021-07-09
Payer: COMMERCIAL

## 2021-07-09 VITALS
TEMPERATURE: 99 F | WEIGHT: 191.69 LBS | DIASTOLIC BLOOD PRESSURE: 76 MMHG | BODY MASS INDEX: 25.96 KG/M2 | RESPIRATION RATE: 18 BRPM | OXYGEN SATURATION: 97 % | HEART RATE: 89 BPM | SYSTOLIC BLOOD PRESSURE: 120 MMHG | HEIGHT: 72 IN

## 2021-07-09 DIAGNOSIS — R42 DIZZINESS: ICD-10-CM

## 2021-07-09 DIAGNOSIS — T78.40XD ALLERGY, SUBSEQUENT ENCOUNTER: ICD-10-CM

## 2021-07-09 DIAGNOSIS — F41.9 ANXIETY: ICD-10-CM

## 2021-07-09 PROBLEM — T78.40XA ALLERGY: Status: ACTIVE | Noted: 2021-07-09

## 2021-07-09 PROCEDURE — 99999 PR PBB SHADOW E&M-EST. PATIENT-LVL IV: CPT | Mod: PBBFAC,,, | Performed by: FAMILY MEDICINE

## 2021-07-09 PROCEDURE — 3008F BODY MASS INDEX DOCD: CPT | Mod: CPTII,S$GLB,, | Performed by: FAMILY MEDICINE

## 2021-07-09 PROCEDURE — 99214 PR OFFICE/OUTPT VISIT, EST, LEVL IV, 30-39 MIN: ICD-10-PCS | Mod: S$GLB,,, | Performed by: FAMILY MEDICINE

## 2021-07-09 PROCEDURE — 99999 PR PBB SHADOW E&M-EST. PATIENT-LVL IV: ICD-10-PCS | Mod: PBBFAC,,, | Performed by: FAMILY MEDICINE

## 2021-07-09 PROCEDURE — 3008F PR BODY MASS INDEX (BMI) DOCUMENTED: ICD-10-PCS | Mod: CPTII,S$GLB,, | Performed by: FAMILY MEDICINE

## 2021-07-09 PROCEDURE — 1126F PR PAIN SEVERITY QUANTIFIED, NO PAIN PRESENT: ICD-10-PCS | Mod: S$GLB,,, | Performed by: FAMILY MEDICINE

## 2021-07-09 PROCEDURE — 99214 OFFICE O/P EST MOD 30 MIN: CPT | Mod: S$GLB,,, | Performed by: FAMILY MEDICINE

## 2021-07-09 PROCEDURE — 1126F AMNT PAIN NOTED NONE PRSNT: CPT | Mod: S$GLB,,, | Performed by: FAMILY MEDICINE

## 2021-07-22 ENCOUNTER — TELEPHONE (OUTPATIENT)
Dept: PRIMARY CARE CLINIC | Facility: CLINIC | Age: 51
End: 2021-07-22

## 2022-08-16 ENCOUNTER — OFFICE VISIT (OUTPATIENT)
Dept: PRIMARY CARE CLINIC | Facility: CLINIC | Age: 52
End: 2022-08-16
Payer: COMMERCIAL

## 2022-08-16 VITALS
DIASTOLIC BLOOD PRESSURE: 70 MMHG | RESPIRATION RATE: 18 BRPM | OXYGEN SATURATION: 97 % | SYSTOLIC BLOOD PRESSURE: 116 MMHG | HEIGHT: 72 IN | WEIGHT: 193.25 LBS | BODY MASS INDEX: 26.18 KG/M2 | HEART RATE: 70 BPM

## 2022-08-16 DIAGNOSIS — G47.00 INSOMNIA, UNSPECIFIED TYPE: ICD-10-CM

## 2022-08-16 DIAGNOSIS — I10 ESSENTIAL HYPERTENSION: ICD-10-CM

## 2022-08-16 DIAGNOSIS — S99.921A INJURY OF RIGHT FOOT, INITIAL ENCOUNTER: Primary | ICD-10-CM

## 2022-08-16 PROCEDURE — 1160F RVW MEDS BY RX/DR IN RCRD: CPT | Mod: CPTII,S$GLB,, | Performed by: INTERNAL MEDICINE

## 2022-08-16 PROCEDURE — 3074F SYST BP LT 130 MM HG: CPT | Mod: CPTII,S$GLB,, | Performed by: INTERNAL MEDICINE

## 2022-08-16 PROCEDURE — 1159F MED LIST DOCD IN RCRD: CPT | Mod: CPTII,S$GLB,, | Performed by: INTERNAL MEDICINE

## 2022-08-16 PROCEDURE — 3008F BODY MASS INDEX DOCD: CPT | Mod: CPTII,S$GLB,, | Performed by: INTERNAL MEDICINE

## 2022-08-16 PROCEDURE — 3074F PR MOST RECENT SYSTOLIC BLOOD PRESSURE < 130 MM HG: ICD-10-PCS | Mod: CPTII,S$GLB,, | Performed by: INTERNAL MEDICINE

## 2022-08-16 PROCEDURE — 99213 PR OFFICE/OUTPT VISIT, EST, LEVL III, 20-29 MIN: ICD-10-PCS | Mod: S$GLB,,, | Performed by: INTERNAL MEDICINE

## 2022-08-16 PROCEDURE — 99999 PR PBB SHADOW E&M-EST. PATIENT-LVL IV: ICD-10-PCS | Mod: PBBFAC,,, | Performed by: INTERNAL MEDICINE

## 2022-08-16 PROCEDURE — 99999 PR PBB SHADOW E&M-EST. PATIENT-LVL IV: CPT | Mod: PBBFAC,,, | Performed by: INTERNAL MEDICINE

## 2022-08-16 PROCEDURE — 3008F PR BODY MASS INDEX (BMI) DOCUMENTED: ICD-10-PCS | Mod: CPTII,S$GLB,, | Performed by: INTERNAL MEDICINE

## 2022-08-16 PROCEDURE — 3078F PR MOST RECENT DIASTOLIC BLOOD PRESSURE < 80 MM HG: ICD-10-PCS | Mod: CPTII,S$GLB,, | Performed by: INTERNAL MEDICINE

## 2022-08-16 PROCEDURE — 1160F PR REVIEW ALL MEDS BY PRESCRIBER/CLIN PHARMACIST DOCUMENTED: ICD-10-PCS | Mod: CPTII,S$GLB,, | Performed by: INTERNAL MEDICINE

## 2022-08-16 PROCEDURE — 1159F PR MEDICATION LIST DOCUMENTED IN MEDICAL RECORD: ICD-10-PCS | Mod: CPTII,S$GLB,, | Performed by: INTERNAL MEDICINE

## 2022-08-16 PROCEDURE — 3078F DIAST BP <80 MM HG: CPT | Mod: CPTII,S$GLB,, | Performed by: INTERNAL MEDICINE

## 2022-08-16 PROCEDURE — 99213 OFFICE O/P EST LOW 20 MIN: CPT | Mod: S$GLB,,, | Performed by: INTERNAL MEDICINE

## 2022-08-16 RX ORDER — TEMAZEPAM 15 MG/1
15 CAPSULE ORAL NIGHTLY
Qty: 30 CAPSULE | Refills: 1 | Status: SHIPPED | OUTPATIENT
Start: 2022-08-16 | End: 2023-02-02

## 2022-08-16 RX ORDER — IBUPROFEN 800 MG/1
800 TABLET ORAL EVERY 6 HOURS PRN
Qty: 60 TABLET | Refills: 1 | Status: SHIPPED | OUTPATIENT
Start: 2022-08-16 | End: 2024-03-20

## 2022-08-16 NOTE — PROGRESS NOTES
Subjective:       Patient ID: Jonathan Srinivasan is a 51 y.o. male.    Chief Complaint: Foot Pain (Right )    HPI  Pt viist today c/o twisted rt foot while walking up steps hurt something pop now swellling tender knot rt food try to walk it out but still with tender swelling at bottom left foot  No other c/o  Review of Systems    Objective:      Physical Exam  Vitals and nursing note reviewed.   Constitutional:       General: He is not in acute distress.     Appearance: He is well-developed.   HENT:      Head: Normocephalic and atraumatic.      Right Ear: External ear normal.      Left Ear: External ear normal.      Nose: Nose normal.      Mouth/Throat:      Pharynx: No oropharyngeal exudate.   Eyes:      Extraocular Movements: Extraocular movements intact.      Conjunctiva/sclera: Conjunctivae normal.      Pupils: Pupils are equal, round, and reactive to light.   Neck:      Thyroid: No thyromegaly.   Cardiovascular:      Rate and Rhythm: Normal rate and regular rhythm.      Heart sounds: Normal heart sounds. No murmur heard.    No friction rub. No gallop.   Pulmonary:      Effort: Pulmonary effort is normal. No respiratory distress.      Breath sounds: Normal breath sounds. No wheezing.   Abdominal:      General: Bowel sounds are normal. There is no distension.      Palpations: Abdomen is soft.      Tenderness: There is no abdominal tenderness.   Musculoskeletal:         General: Tenderness (left foot at middle of the sole there small tender nodule  and small bruise along ) present. No deformity. Normal range of motion.      Cervical back: Normal range of motion and neck supple.   Lymphadenopathy:      Cervical: No cervical adenopathy.   Skin:     General: Skin is warm and dry.      Findings: No erythema or rash.   Neurological:      Mental Status: He is alert and oriented to person, place, and time.   Psychiatric:         Mood and Affect: Mood normal.         Thought Content: Thought content normal.          Judgment: Judgment normal.         Assessment:       1. Injury of right foot, initial encounter    2. Insomnia, unspecified type    3. Essential hypertension        Plan:       Injury of right foot, initial encounter  Comments:  probably contusion with hematoma vs small ganglionic cyst nad pt will shoe incert and seee DR REYES in 2 days  Orders:  -     Ambulatory referral/consult to Podiatry; Future; Expected date: 08/16/2022  -     ibuprofen (ADVIL,MOTRIN) 800 MG tablet; Take 1 tablet (800 mg total) by mouth every 6 (six) hours as needed for Pain. Take with food  Dispense: 60 tablet; Refill: 1    Insomnia, unspecified type  -     temazepam (RESTORIL) 15 mg Cap; Take 1 capsule (15 mg total) by mouth every evening.  Dispense: 30 capsule; Refill: 1    Essential hypertension  Comments:  well controlled continue wtith tx        Medication List with Changes/Refills   New Medications    IBUPROFEN (ADVIL,MOTRIN) 800 MG TABLET    Take 1 tablet (800 mg total) by mouth every 6 (six) hours as needed for Pain. Take with food   Current Medications    GABAPENTIN (NEURONTIN) 300 MG CAPSULE    Take 1 capsule by mouth every evening.   Changed and/or Refilled Medications    Modified Medication Previous Medication    TEMAZEPAM (RESTORIL) 15 MG CAP temazepam (RESTORIL) 15 mg Cap       Take 1 capsule (15 mg total) by mouth every evening.    Take 1 capsule by mouth every evening.

## 2022-08-18 ENCOUNTER — OFFICE VISIT (OUTPATIENT)
Dept: PODIATRY | Facility: CLINIC | Age: 52
End: 2022-08-18
Payer: COMMERCIAL

## 2022-08-18 VITALS
DIASTOLIC BLOOD PRESSURE: 75 MMHG | HEART RATE: 69 BPM | WEIGHT: 193 LBS | BODY MASS INDEX: 26.18 KG/M2 | SYSTOLIC BLOOD PRESSURE: 143 MMHG

## 2022-08-18 DIAGNOSIS — G25.81 RESTLESS LEG SYNDROME: Primary | ICD-10-CM

## 2022-08-18 DIAGNOSIS — S99.921A INJURY OF RIGHT FOOT, INITIAL ENCOUNTER: ICD-10-CM

## 2022-08-18 DIAGNOSIS — M62.9 NONTRAUMATIC TEAR OF PLANTAR FASCIA: ICD-10-CM

## 2022-08-18 PROCEDURE — 99203 OFFICE O/P NEW LOW 30 MIN: CPT | Mod: S$GLB,,, | Performed by: PODIATRIST

## 2022-08-18 PROCEDURE — 99999 PR PBB SHADOW E&M-EST. PATIENT-LVL III: ICD-10-PCS | Mod: PBBFAC,,, | Performed by: PODIATRIST

## 2022-08-18 PROCEDURE — 3078F DIAST BP <80 MM HG: CPT | Mod: CPTII,S$GLB,, | Performed by: PODIATRIST

## 2022-08-18 PROCEDURE — 3008F BODY MASS INDEX DOCD: CPT | Mod: CPTII,S$GLB,, | Performed by: PODIATRIST

## 2022-08-18 PROCEDURE — 3077F SYST BP >= 140 MM HG: CPT | Mod: CPTII,S$GLB,, | Performed by: PODIATRIST

## 2022-08-18 PROCEDURE — 3078F PR MOST RECENT DIASTOLIC BLOOD PRESSURE < 80 MM HG: ICD-10-PCS | Mod: CPTII,S$GLB,, | Performed by: PODIATRIST

## 2022-08-18 PROCEDURE — 99999 PR PBB SHADOW E&M-EST. PATIENT-LVL III: CPT | Mod: PBBFAC,,, | Performed by: PODIATRIST

## 2022-08-18 PROCEDURE — 99203 PR OFFICE/OUTPT VISIT, NEW, LEVL III, 30-44 MIN: ICD-10-PCS | Mod: S$GLB,,, | Performed by: PODIATRIST

## 2022-08-18 PROCEDURE — 3077F PR MOST RECENT SYSTOLIC BLOOD PRESSURE >= 140 MM HG: ICD-10-PCS | Mod: CPTII,S$GLB,, | Performed by: PODIATRIST

## 2022-08-18 PROCEDURE — 1159F MED LIST DOCD IN RCRD: CPT | Mod: CPTII,S$GLB,, | Performed by: PODIATRIST

## 2022-08-18 PROCEDURE — 1159F PR MEDICATION LIST DOCUMENTED IN MEDICAL RECORD: ICD-10-PCS | Mod: CPTII,S$GLB,, | Performed by: PODIATRIST

## 2022-08-18 PROCEDURE — 3008F PR BODY MASS INDEX (BMI) DOCUMENTED: ICD-10-PCS | Mod: CPTII,S$GLB,, | Performed by: PODIATRIST

## 2022-08-18 NOTE — PROGRESS NOTES
Subjective:      Patient ID: Jonathan Srinivasan is a 52 y.o. male.    Chief Complaint: Foot Pain (R foot )    Jonathan is a 52 y.o. male who presents to the podiatry clinic  with complaint of  right foot pain. Onset of the symptoms was a week ago. Precipitating event: injured walking across parking lot parking bump & felt a snap 'like a rubber band' medial arch; bruised & sore to touch although improved . Current symptoms include: ability to bear weight, but with some pain. Aggravating factors: any weight bearing. Symptoms have gradually improved. Patient rates pain 2/10 on pain scale.    Past Medical History:   Diagnosis Date    Allergy     Hyperlipidemia      Patient Active Problem List   Diagnosis    Hypercholesteremia    Erectile dysfunction    Anxiety    Insomnia    Dizziness    Allergy     PCP: Wade Covington MD     Objective:      Review of Systems   Constitutional: Negative for malaise/fatigue.   Hematologic/Lymphatic: Does not bruise/bleed easily.   Skin:  Positive for color change. Negative for dry skin, rash and suspicious lesions.   Musculoskeletal:  Positive for myalgias. Negative for falls, joint pain, muscle cramps and muscle weakness.   Neurological:  Negative for focal weakness, numbness, paresthesias and weakness.   Psychiatric/Behavioral:  The patient is not nervous/anxious.    Physical Exam  Vitals reviewed.   Constitutional:       General: He is not in acute distress.     Appearance: He is well-developed and overweight.   Cardiovascular:      Pulses: Normal pulses.           Dorsalis pedis pulses are 2+ on the left side.   Musculoskeletal:         General: Swelling, tenderness and signs of injury present.      Right lower leg: No edema.      Left lower leg: No edema.        Feet:    Feet:      Comments: No pain w/ dorsiflexion R foot nor 1st MPJ dorsiflexion. TTP loclaized to plantar mid-central fascia MLA - no defect noted in fascia.    Skin:     General: Skin is warm.      Capillary Refill:  Capillary refill takes less than 2 seconds.      Findings: Bruising, ecchymosis and erythema present. No lesion.      Comments: Plantar central MLA R ecchymosis.    Neurological:      Mental Status: He is alert and oriented to person, place, and time.      Sensory: No sensory deficit.      Motor: No weakness.      Gait: Gait normal.   Psychiatric:         Mood and Affect: Mood normal.         Behavior: Behavior normal. Behavior is cooperative.         Assessment:      Encounter Diagnoses   Name Primary?    Injury of right foot, initial encounter     Restless leg syndrome Yes    Nontraumatic tear of plantar fascia       Problem List Items Addressed This Visit    None  Visit Diagnoses       Restless leg syndrome    -  Primary    Injury of right foot, initial encounter        probably contusion with hematoma vs small ganglionic cyst nad pt will shoe incert and seegreta REYES in 2 days    Nontraumatic tear of plantar fascia                Plan:       Jonathan was seen today for foot pain.    Diagnoses and all orders for this visit:    Restless leg syndrome    Injury of right foot, initial encounter  Comments:  probably contusion with hematoma vs small ganglionic cyst nad pt will shoe incert and seegreta REYES in 2 days  Orders:  -     Ambulatory referral/consult to Podiatry    Nontraumatic tear of plantar fascia  I counseled the patient on his conditions, their implications and medical management.    - Shoe inspection. Patient instructed on proper supportive shoe gear.    Dispensed Silopos gelstrap.    May use topical NSAID gel prn - Voltaren 1% up to qid, available OTC generic.    F/U prn.

## 2022-08-30 DIAGNOSIS — N50.819 TESTIS PAIN: Primary | ICD-10-CM

## 2023-01-20 ENCOUNTER — PATIENT OUTREACH (OUTPATIENT)
Dept: ADMINISTRATIVE | Facility: HOSPITAL | Age: 53
End: 2023-01-20
Payer: COMMERCIAL

## 2023-01-20 NOTE — PROGRESS NOTES
Health Maintenance Due   Topic Date Due    COVID-19 Vaccine (1) Never done    Colorectal Cancer Screening  Never done    Shingles Vaccine (1 of 2) Never done    Influenza Vaccine (1) 09/01/2022        Chart review done.   HM updated.   Immunizations reviewed & updated.   Care Everywhere updated.  Orders Entered:  NA

## 2023-02-02 ENCOUNTER — OFFICE VISIT (OUTPATIENT)
Dept: PRIMARY CARE CLINIC | Facility: CLINIC | Age: 53
End: 2023-02-02
Payer: COMMERCIAL

## 2023-02-02 VITALS
BODY MASS INDEX: 25.83 KG/M2 | TEMPERATURE: 98 F | HEART RATE: 60 BPM | OXYGEN SATURATION: 98 % | DIASTOLIC BLOOD PRESSURE: 68 MMHG | SYSTOLIC BLOOD PRESSURE: 104 MMHG | RESPIRATION RATE: 18 BRPM | HEIGHT: 72 IN | WEIGHT: 190.69 LBS

## 2023-02-02 DIAGNOSIS — Z00.00 ANNUAL PHYSICAL EXAM: ICD-10-CM

## 2023-02-02 DIAGNOSIS — F33.1 MDD (MAJOR DEPRESSIVE DISORDER), RECURRENT EPISODE, MODERATE: ICD-10-CM

## 2023-02-02 DIAGNOSIS — G47.00 INSOMNIA, UNSPECIFIED TYPE: Primary | ICD-10-CM

## 2023-02-02 PROCEDURE — 3008F BODY MASS INDEX DOCD: CPT | Mod: CPTII,S$GLB,, | Performed by: NURSE PRACTITIONER

## 2023-02-02 PROCEDURE — 1159F PR MEDICATION LIST DOCUMENTED IN MEDICAL RECORD: ICD-10-PCS | Mod: CPTII,S$GLB,, | Performed by: NURSE PRACTITIONER

## 2023-02-02 PROCEDURE — 99214 PR OFFICE/OUTPT VISIT, EST, LEVL IV, 30-39 MIN: ICD-10-PCS | Mod: S$GLB,,, | Performed by: NURSE PRACTITIONER

## 2023-02-02 PROCEDURE — 3074F PR MOST RECENT SYSTOLIC BLOOD PRESSURE < 130 MM HG: ICD-10-PCS | Mod: CPTII,S$GLB,, | Performed by: NURSE PRACTITIONER

## 2023-02-02 PROCEDURE — 3078F DIAST BP <80 MM HG: CPT | Mod: CPTII,S$GLB,, | Performed by: NURSE PRACTITIONER

## 2023-02-02 PROCEDURE — 1159F MED LIST DOCD IN RCRD: CPT | Mod: CPTII,S$GLB,, | Performed by: NURSE PRACTITIONER

## 2023-02-02 PROCEDURE — 3008F PR BODY MASS INDEX (BMI) DOCUMENTED: ICD-10-PCS | Mod: CPTII,S$GLB,, | Performed by: NURSE PRACTITIONER

## 2023-02-02 PROCEDURE — 99999 PR PBB SHADOW E&M-EST. PATIENT-LVL IV: ICD-10-PCS | Mod: PBBFAC,,, | Performed by: NURSE PRACTITIONER

## 2023-02-02 PROCEDURE — 99999 PR PBB SHADOW E&M-EST. PATIENT-LVL IV: CPT | Mod: PBBFAC,,, | Performed by: NURSE PRACTITIONER

## 2023-02-02 PROCEDURE — 3074F SYST BP LT 130 MM HG: CPT | Mod: CPTII,S$GLB,, | Performed by: NURSE PRACTITIONER

## 2023-02-02 PROCEDURE — 99214 OFFICE O/P EST MOD 30 MIN: CPT | Mod: S$GLB,,, | Performed by: NURSE PRACTITIONER

## 2023-02-02 PROCEDURE — 3078F PR MOST RECENT DIASTOLIC BLOOD PRESSURE < 80 MM HG: ICD-10-PCS | Mod: CPTII,S$GLB,, | Performed by: NURSE PRACTITIONER

## 2023-02-02 RX ORDER — CLONIDINE HYDROCHLORIDE 0.1 MG/1
0.1 TABLET ORAL 2 TIMES DAILY
Qty: 30 TABLET | Refills: 2 | Status: SHIPPED | OUTPATIENT
Start: 2023-02-02 | End: 2023-03-27

## 2023-02-02 RX ORDER — TEMAZEPAM 15 MG/1
15 CAPSULE ORAL NIGHTLY PRN
Qty: 30 CAPSULE | Refills: 2 | Status: SHIPPED | OUTPATIENT
Start: 2023-02-02 | End: 2023-03-04

## 2023-02-02 RX ORDER — TEMAZEPAM 30 MG/1
30 CAPSULE ORAL NIGHTLY
COMMUNITY
Start: 2023-01-16 | End: 2023-02-02

## 2023-02-02 NOTE — PROGRESS NOTES
Chief Complaint  Chief Complaint   Patient presents with    Annual Exam       HPI  52 year old WM here for annual exam.     Patient reports he is not able to sleep without his Restoril. He reports his mind is racing when he is trying to go to sleep. He states he had a sleep study done about 1-2 years ago. Reports Dr. Sanchez started him on restoril and gabapentin for restless legs syndrome. Patient reports he has tried OTC sleep medications and they were not helpful. Patient reports trouble falling asleep and staying asleep. With restoril, he sleeps about 8 hours per night. Patient reports sometimes he feels like he is not rested when he wakes up in the morning. Patient reports history of PTSD and has nightmares as a result. Patient reports Dr. Pete recently increased Restoril to 30mg but he is not sure why. Pt reports he is sleeping good currently with the restoril but reports he does wake up almost every night, but is able to fall back asleep. He is open to alternative medications but would like to keep his restoril and get a refill. He stopped going to Dr. Pete due to not being able to get an appointment. He also reports he started him on a medication that did not agree with him, he cannot recall what medication it is. Pt also reports difficulty focusing at work. Reports history of being on Vyvanse by Dr. Pete. He states it helped him but he is worried due to his sister being dependent on it. Was previously on vyvanse 30 mg in 2020 per .     He would like to get some blood work done today.    PAST MEDICAL HISTORY:  Past Medical History:   Diagnosis Date    Allergy     Hyperlipidemia        PAST SURGICAL HISTORY:  Past Surgical History:   Procedure Laterality Date    SINUS SURGERY         SOCIAL HISTORY:  Social History     Socioeconomic History    Marital status: Single   Occupational History    Occupation:    Tobacco Use    Smoking status: Never    Smokeless tobacco: Never   Substance and  Sexual Activity    Alcohol use: Yes     Alcohol/week: 2.0 standard drinks     Types: 1 Cans of beer, 1 Standard drinks or equivalent per week     Comment: very rarely    Drug use: No    Sexual activity: Yes     Partners: Female     Birth control/protection: Condom       FAMILY HISTORY:  Family History   Problem Relation Age of Onset    Arthritis Mother     Heart disease Maternal Grandmother     Heart disease Maternal Grandfather     Heart disease Paternal Grandmother     Heart disease Paternal Grandfather        ALLERGIES AND MEDICATIONS: updated and reviewed.  Review of patient's allergies indicates:  No Known Allergies  Current Outpatient Medications   Medication Sig Dispense Refill    gabapentin (NEURONTIN) 300 MG capsule Take 1 capsule by mouth every evening.      ibuprofen (ADVIL,MOTRIN) 800 MG tablet Take 1 tablet (800 mg total) by mouth every 6 (six) hours as needed for Pain. Take with food 60 tablet 1    cloNIDine (CATAPRES) 0.1 MG tablet Take 1 tablet (0.1 mg total) by mouth 2 (two) times daily. 30 tablet 2    temazepam (RESTORIL) 15 mg Cap Take 1 capsule (15 mg total) by mouth nightly as needed (insomnia). 30 capsule 2     No current facility-administered medications for this visit.         ROS  Review of Systems   Constitutional: Negative.    HENT: Negative.     Eyes: Negative.    Respiratory: Negative.     Cardiovascular: Negative.    Gastrointestinal: Negative.    Endocrine: Negative.    Genitourinary: Negative.    Musculoskeletal: Negative.    Skin: Negative.    Allergic/Immunologic: Negative.    Neurological: Negative.    Hematological: Negative.    Psychiatric/Behavioral:  Positive for decreased concentration, dysphoric mood and sleep disturbance. The patient is nervous/anxious.          PHYSICAL EXAM  Vitals:    02/02/23 1041   BP: 104/68   BP Location: Left arm   Patient Position: Sitting   BP Method: Medium (Manual)   Pulse: 60   Resp: 18   Temp: 97.5 °F (36.4 °C)   TempSrc: Temporal   SpO2: 98%    Weight: 86.5 kg (190 lb 11.2 oz)   Height: 6' (1.829 m)    Body mass index is 25.86 kg/m².  Weight: 86.5 kg (190 lb 11.2 oz)   Height: 6' (182.9 cm)     Physical Exam  Constitutional:       Appearance: Normal appearance.   HENT:      Head: Normocephalic and atraumatic.      Right Ear: Tympanic membrane normal.      Left Ear: Tympanic membrane normal.      Nose: Nose normal.      Mouth/Throat:      Mouth: Mucous membranes are moist.   Eyes:      Conjunctiva/sclera: Conjunctivae normal.   Cardiovascular:      Rate and Rhythm: Normal rate and regular rhythm.      Pulses: Normal pulses.      Heart sounds: Normal heart sounds.   Pulmonary:      Effort: Pulmonary effort is normal.      Breath sounds: Normal breath sounds.   Musculoskeletal:         General: Normal range of motion.      Cervical back: Normal range of motion.   Skin:     General: Skin is warm and dry.      Capillary Refill: Capillary refill takes less than 2 seconds.   Neurological:      Mental Status: He is alert and oriented to person, place, and time.   Psychiatric:         Behavior: Behavior normal.         Thought Content: Thought content normal.         Judgment: Judgment normal.       Health Maintenance         Date Due Completion Date    COVID-19 Vaccine (1) Never done ---    Colorectal Cancer Screening Never done ---    Shingles Vaccine (1 of 2) Never done ---    Influenza Vaccine (1) 09/01/2022 11/23/2015    Lipid Panel 04/16/2026 4/16/2021    TETANUS VACCINE 07/09/2029 7/9/2019              Assessment & Plan          Problem List Items Addressed This Visit          Other    Insomnia - Primary    Relevant Medications    temazepam (RESTORIL) 15 mg Cap    cloNIDine (CATAPRES) 0.1 MG tablet    Add clonidine for insomnia consider weaning temazepam as tolerated, goal taking for breakthrough anxiety.      Other Visit Diagnoses       Annual physical exam        Relevant Orders    CBC Auto Differential    Comprehensive Metabolic Panel    Lipid Panel     PSA, Screening    TSH    MDD (major depressive disorder), recurrent episode, moderate      Obtain VANESSA to consult with Dr. Pete. Consider adding SSRI. May need referral to new psychiatrist.             Follow-up: Follow up in 4-6 weeks, May follow up on portal if symptoms persist or worsen. Follow up in 2 weeks via portal for medication adjustment.     I have reviewed the notes, assessments, and/or procedures performed by this student, I concur with her/his documentation.    Glo Allen    Medication List with Changes/Refills   New Medications    CLONIDINE (CATAPRES) 0.1 MG TABLET    Take 1 tablet (0.1 mg total) by mouth 2 (two) times daily.    TEMAZEPAM (RESTORIL) 15 MG CAP    Take 1 capsule (15 mg total) by mouth nightly as needed (insomnia).   Current Medications    GABAPENTIN (NEURONTIN) 300 MG CAPSULE    Take 1 capsule by mouth every evening.    IBUPROFEN (ADVIL,MOTRIN) 800 MG TABLET    Take 1 tablet (800 mg total) by mouth every 6 (six) hours as needed for Pain. Take with food   Discontinued Medications    TEMAZEPAM (RESTORIL) 15 MG CAP    Take 1 capsule (15 mg total) by mouth every evening.    TEMAZEPAM (RESTORIL) 30 MG CAPSULE    Take 30 mg by mouth every evening. at bedtime

## 2023-03-13 ENCOUNTER — PATIENT OUTREACH (OUTPATIENT)
Dept: ADMINISTRATIVE | Facility: HOSPITAL | Age: 53
End: 2023-03-13
Payer: COMMERCIAL

## 2023-03-13 NOTE — PROGRESS NOTES
Health Maintenance Due   Topic Date Due    COVID-19 Vaccine (1) Never done    Hemoglobin A1c (Diabetic Prevention Screening)  Never done    Colorectal Cancer Screening  Never done    Shingles Vaccine (1 of 2) Never done    Influenza Vaccine (1) 09/01/2022        Chart review done.   HM updated.   Immunizations reviewed & updated.   Care Everywhere updated.

## 2023-03-27 ENCOUNTER — OFFICE VISIT (OUTPATIENT)
Dept: PRIMARY CARE CLINIC | Facility: CLINIC | Age: 53
End: 2023-03-27
Payer: COMMERCIAL

## 2023-03-27 VITALS
OXYGEN SATURATION: 98 % | HEIGHT: 72 IN | WEIGHT: 189.38 LBS | BODY MASS INDEX: 25.65 KG/M2 | SYSTOLIC BLOOD PRESSURE: 108 MMHG | DIASTOLIC BLOOD PRESSURE: 70 MMHG | HEART RATE: 67 BPM | RESPIRATION RATE: 19 BRPM

## 2023-03-27 DIAGNOSIS — Z12.11 SCREENING FOR COLON CANCER: Primary | ICD-10-CM

## 2023-03-27 DIAGNOSIS — E78.00 HYPERCHOLESTEREMIA: Chronic | ICD-10-CM

## 2023-03-27 DIAGNOSIS — F43.10 PTSD (POST-TRAUMATIC STRESS DISORDER): ICD-10-CM

## 2023-03-27 DIAGNOSIS — F51.01 PRIMARY INSOMNIA: ICD-10-CM

## 2023-03-27 PROBLEM — Z00.00 ANNUAL PHYSICAL EXAM: Status: ACTIVE | Noted: 2023-03-27

## 2023-03-27 PROBLEM — R42 DIZZINESS: Status: RESOLVED | Noted: 2021-07-09 | Resolved: 2023-03-27

## 2023-03-27 PROBLEM — E78.5 HYPERLIPIDEMIA: Status: ACTIVE | Noted: 2023-03-27

## 2023-03-27 PROCEDURE — 3008F PR BODY MASS INDEX (BMI) DOCUMENTED: ICD-10-PCS | Mod: CPTII,S$GLB,, | Performed by: NURSE PRACTITIONER

## 2023-03-27 PROCEDURE — 3008F BODY MASS INDEX DOCD: CPT | Mod: CPTII,S$GLB,, | Performed by: NURSE PRACTITIONER

## 2023-03-27 PROCEDURE — 99214 OFFICE O/P EST MOD 30 MIN: CPT | Mod: S$GLB,,, | Performed by: NURSE PRACTITIONER

## 2023-03-27 PROCEDURE — 3074F PR MOST RECENT SYSTOLIC BLOOD PRESSURE < 130 MM HG: ICD-10-PCS | Mod: CPTII,S$GLB,, | Performed by: NURSE PRACTITIONER

## 2023-03-27 PROCEDURE — 99999 PR PBB SHADOW E&M-EST. PATIENT-LVL III: ICD-10-PCS | Mod: PBBFAC,,, | Performed by: NURSE PRACTITIONER

## 2023-03-27 PROCEDURE — 3078F PR MOST RECENT DIASTOLIC BLOOD PRESSURE < 80 MM HG: ICD-10-PCS | Mod: CPTII,S$GLB,, | Performed by: NURSE PRACTITIONER

## 2023-03-27 PROCEDURE — 3074F SYST BP LT 130 MM HG: CPT | Mod: CPTII,S$GLB,, | Performed by: NURSE PRACTITIONER

## 2023-03-27 PROCEDURE — 99214 PR OFFICE/OUTPT VISIT, EST, LEVL IV, 30-39 MIN: ICD-10-PCS | Mod: S$GLB,,, | Performed by: NURSE PRACTITIONER

## 2023-03-27 PROCEDURE — 1159F PR MEDICATION LIST DOCUMENTED IN MEDICAL RECORD: ICD-10-PCS | Mod: CPTII,S$GLB,, | Performed by: NURSE PRACTITIONER

## 2023-03-27 PROCEDURE — 3078F DIAST BP <80 MM HG: CPT | Mod: CPTII,S$GLB,, | Performed by: NURSE PRACTITIONER

## 2023-03-27 PROCEDURE — 99999 PR PBB SHADOW E&M-EST. PATIENT-LVL III: CPT | Mod: PBBFAC,,, | Performed by: NURSE PRACTITIONER

## 2023-03-27 PROCEDURE — 1159F MED LIST DOCD IN RCRD: CPT | Mod: CPTII,S$GLB,, | Performed by: NURSE PRACTITIONER

## 2023-03-27 RX ORDER — TEMAZEPAM 30 MG/1
30 CAPSULE ORAL NIGHTLY PRN
Qty: 30 CAPSULE | Refills: 2 | Status: SHIPPED | OUTPATIENT
Start: 2023-03-27 | End: 2023-04-27 | Stop reason: SDUPTHER

## 2023-03-27 RX ORDER — TEMAZEPAM 15 MG/1
CAPSULE ORAL
COMMUNITY
Start: 2023-03-13 | End: 2023-03-27

## 2023-03-27 RX ORDER — DULOXETIN HYDROCHLORIDE 30 MG/1
30 CAPSULE, DELAYED RELEASE ORAL DAILY
Qty: 30 CAPSULE | Refills: 11 | Status: SHIPPED | OUTPATIENT
Start: 2023-03-27 | End: 2024-03-20 | Stop reason: SDUPTHER

## 2023-03-27 NOTE — PROGRESS NOTES
Chief Complaint  Chief Complaint   Patient presents with    Follow-up     2 week     Insomnia     Pt stated that the medication is not working          HPI    Patient continues with difficulty sleeping nightly for the last week and a half. Was recently switched to clonidine 0.1 mg which did not help as much as he wanted. Has been on restoril 15 mg daily for some time. Worsening of sleep recently. No new stressors at work or home. Has tried various sleep medications in the past with difficulty remembering the medications. Expresses frustration with his inability to sleep despite trying various medications. Has been suffering with this for years. Does endorse a previous diagnosis of PTSD for which he was treated with Wellbutrin, Strattera, temazepam.  Patient requesting a release of information from Dr. Pete particularly a call to discuss his case.    PAST MEDICAL HISTORY:  Past Medical History:   Diagnosis Date    Allergy     Hyperlipidemia        PAST SURGICAL HISTORY:  Past Surgical History:   Procedure Laterality Date    SINUS SURGERY         SOCIAL HISTORY:  Social History     Socioeconomic History    Marital status: Single   Occupational History    Occupation:    Tobacco Use    Smoking status: Never    Smokeless tobacco: Never   Substance and Sexual Activity    Alcohol use: Yes     Alcohol/week: 2.0 standard drinks     Types: 1 Cans of beer, 1 Standard drinks or equivalent per week     Comment: very rarely    Drug use: No    Sexual activity: Yes     Partners: Female     Birth control/protection: Condom       FAMILY HISTORY:  Family History   Problem Relation Age of Onset    Arthritis Mother     Heart disease Maternal Grandmother     Heart disease Maternal Grandfather     Heart disease Paternal Grandmother     Heart disease Paternal Grandfather        ALLERGIES AND MEDICATIONS: updated and reviewed.  Review of patient's allergies indicates:  No Known Allergies  Current Outpatient Medications    Medication Sig Dispense Refill    gabapentin (NEURONTIN) 300 MG capsule Take 1 capsule by mouth every evening.      ibuprofen (ADVIL,MOTRIN) 800 MG tablet Take 1 tablet (800 mg total) by mouth every 6 (six) hours as needed for Pain. Take with food 60 tablet 1    DULoxetine (CYMBALTA) 30 MG capsule Take 1 capsule (30 mg total) by mouth once daily. 30 capsule 11    temazepam (RESTORIL) 30 mg capsule Take 1 capsule (30 mg total) by mouth nightly as needed for Insomnia. 30 capsule 2     No current facility-administered medications for this visit.         ROS  Review of Systems   Constitutional:  Negative for chills and fever.   HENT:  Negative for ear pain, postnasal drip and sinus pain.    Respiratory:  Negative for cough and shortness of breath.    Cardiovascular:  Negative for chest pain.   Gastrointestinal:  Negative for diarrhea, nausea and vomiting.   Psychiatric/Behavioral:  Positive for decreased concentration, dysphoric mood and sleep disturbance. Negative for agitation.          PHYSICAL EXAM  Vitals:    03/27/23 0857   BP: 108/70   BP Location: Left arm   Patient Position: Sitting   BP Method: Medium (Manual)   Pulse: 67   Resp: 19   SpO2: 98%   Weight: 85.9 kg (189 lb 6 oz)   Height: 6' (1.829 m)    Body mass index is 25.68 kg/m².  Weight: 85.9 kg (189 lb 6 oz)   Height: 6' (182.9 cm)     Physical Exam  Constitutional:       Appearance: He is well-developed.   HENT:      Head: Normocephalic.      Right Ear: Tympanic membrane normal.      Left Ear: Tympanic membrane normal.      Mouth/Throat:      Pharynx: Uvula midline.   Eyes:      Conjunctiva/sclera: Conjunctivae normal.   Cardiovascular:      Rate and Rhythm: Normal rate and regular rhythm.      Pulses: Normal pulses.           Radial pulses are 2+ on the right side and 2+ on the left side.      Heart sounds: Normal heart sounds. No murmur heard.     Comments: No LE swelling noted  Pulmonary:      Effort: Pulmonary effort is normal.      Breath sounds:  Normal breath sounds. No wheezing.   Abdominal:      General: Bowel sounds are normal.      Palpations: Abdomen is soft.      Tenderness: There is no abdominal tenderness.   Lymphadenopathy:      Cervical: No cervical adenopathy.   Skin:     General: Skin is warm and dry.      Findings: No rash.   Neurological:      Mental Status: He is alert and oriented to person, place, and time.   Psychiatric:         Mood and Affect: Affect is flat.         Health Maintenance         Date Due Completion Date    Hemoglobin A1c (Diabetic Prevention Screening) Never done ---    Colorectal Cancer Screening Never done ---    Influenza Vaccine (1) 06/30/2023 (Originally 9/1/2022) 11/23/2015    Shingles Vaccine (1 of 2) 03/27/2024 (Originally 8/17/2022) ---    COVID-19 Vaccine (1) 03/27/2024 (Originally 2/17/1971) ---    Lipid Panel 04/16/2026 4/16/2021    TETANUS VACCINE 07/09/2029 7/9/2019              Assessment & Plan    Problem List Items Addressed This Visit          Unprioritized    Hypercholesteremia (Chronic)    Overview     Diet controlled         Insomnia    PTSD (post-traumatic stress disorder)    Relevant Medications    DULoxetine (CYMBALTA) 30 MG capsule    temazepam (RESTORIL) 30 mg capsule     Other Visit Diagnoses       Screening for colon cancer    -  Primary    Relevant Orders    Cologuard Screening (Multitarget Stool DNA)            Follow-up: Follow up in about 4 weeks (around 4/24/2023).    Leila Allen    Medication List with Changes/Refills   New Medications    DULOXETINE (CYMBALTA) 30 MG CAPSULE    Take 1 capsule (30 mg total) by mouth once daily.    TEMAZEPAM (RESTORIL) 30 MG CAPSULE    Take 1 capsule (30 mg total) by mouth nightly as needed for Insomnia.   Current Medications    GABAPENTIN (NEURONTIN) 300 MG CAPSULE    Take 1 capsule by mouth every evening.    IBUPROFEN (ADVIL,MOTRIN) 800 MG TABLET    Take 1 tablet (800 mg total) by mouth every 6 (six) hours as needed for Pain. Take with food    Discontinued Medications    CLONIDINE (CATAPRES) 0.1 MG TABLET    Take 1 tablet (0.1 mg total) by mouth 2 (two) times daily.    TEMAZEPAM (RESTORIL) 15 MG CAP

## 2023-03-31 ENCOUNTER — TELEPHONE (OUTPATIENT)
Dept: PRIMARY CARE CLINIC | Facility: CLINIC | Age: 53
End: 2023-03-31
Payer: COMMERCIAL

## 2023-03-31 NOTE — TELEPHONE ENCOUNTER
----- Message from Leila Allen NP sent at 3/27/2023  4:08 PM CDT -----  Significantly elevated cholesterol on most recent blood work.  This is not within treatment range. I do, however, recommend weight loss and dietary modification which will help to bring this down.  Prostate cancer screening is negative.  Thyroid is normal.  Repeat blood work yearly.    The 10-year ASCVD risk score (Marlyn GARDUNO, et al., 2019) is: 3.4%    Values used to calculate the score:      Age: 52 years      Sex: Male      Is Non- : No      Diabetic: No      Tobacco smoker: No      Systolic Blood Pressure: 108 mmHg      Is BP treated: No      HDL Cholesterol: 47 mg/dL      Total Cholesterol: 234 mg/dL

## 2023-04-21 ENCOUNTER — PATIENT MESSAGE (OUTPATIENT)
Dept: ADMINISTRATIVE | Facility: HOSPITAL | Age: 53
End: 2023-04-21
Payer: COMMERCIAL

## 2023-04-27 ENCOUNTER — OFFICE VISIT (OUTPATIENT)
Dept: PRIMARY CARE CLINIC | Facility: CLINIC | Age: 53
End: 2023-04-27
Payer: COMMERCIAL

## 2023-04-27 VITALS
SYSTOLIC BLOOD PRESSURE: 102 MMHG | BODY MASS INDEX: 25.3 KG/M2 | DIASTOLIC BLOOD PRESSURE: 62 MMHG | RESPIRATION RATE: 16 BRPM | TEMPERATURE: 97 F | WEIGHT: 186.81 LBS | HEART RATE: 59 BPM | HEIGHT: 72 IN | OXYGEN SATURATION: 98 %

## 2023-04-27 DIAGNOSIS — E78.5 HYPERLIPIDEMIA, UNSPECIFIED HYPERLIPIDEMIA TYPE: Primary | ICD-10-CM

## 2023-04-27 DIAGNOSIS — F43.10 PTSD (POST-TRAUMATIC STRESS DISORDER): ICD-10-CM

## 2023-04-27 DIAGNOSIS — F51.01 PRIMARY INSOMNIA: ICD-10-CM

## 2023-04-27 PROBLEM — Z00.00 ANNUAL PHYSICAL EXAM: Status: RESOLVED | Noted: 2023-03-27 | Resolved: 2023-04-27

## 2023-04-27 PROCEDURE — 3078F DIAST BP <80 MM HG: CPT | Mod: CPTII,S$GLB,, | Performed by: NURSE PRACTITIONER

## 2023-04-27 PROCEDURE — 99999 PR PBB SHADOW E&M-EST. PATIENT-LVL IV: CPT | Mod: PBBFAC,,, | Performed by: NURSE PRACTITIONER

## 2023-04-27 PROCEDURE — 99214 PR OFFICE/OUTPT VISIT, EST, LEVL IV, 30-39 MIN: ICD-10-PCS | Mod: S$GLB,,, | Performed by: NURSE PRACTITIONER

## 2023-04-27 PROCEDURE — 3008F PR BODY MASS INDEX (BMI) DOCUMENTED: ICD-10-PCS | Mod: CPTII,S$GLB,, | Performed by: NURSE PRACTITIONER

## 2023-04-27 PROCEDURE — 99214 OFFICE O/P EST MOD 30 MIN: CPT | Mod: S$GLB,,, | Performed by: NURSE PRACTITIONER

## 2023-04-27 PROCEDURE — 3074F PR MOST RECENT SYSTOLIC BLOOD PRESSURE < 130 MM HG: ICD-10-PCS | Mod: CPTII,S$GLB,, | Performed by: NURSE PRACTITIONER

## 2023-04-27 PROCEDURE — 3078F PR MOST RECENT DIASTOLIC BLOOD PRESSURE < 80 MM HG: ICD-10-PCS | Mod: CPTII,S$GLB,, | Performed by: NURSE PRACTITIONER

## 2023-04-27 PROCEDURE — 3008F BODY MASS INDEX DOCD: CPT | Mod: CPTII,S$GLB,, | Performed by: NURSE PRACTITIONER

## 2023-04-27 PROCEDURE — 3074F SYST BP LT 130 MM HG: CPT | Mod: CPTII,S$GLB,, | Performed by: NURSE PRACTITIONER

## 2023-04-27 PROCEDURE — 99999 PR PBB SHADOW E&M-EST. PATIENT-LVL IV: ICD-10-PCS | Mod: PBBFAC,,, | Performed by: NURSE PRACTITIONER

## 2023-04-27 RX ORDER — TEMAZEPAM 30 MG/1
30 CAPSULE ORAL NIGHTLY PRN
Qty: 30 CAPSULE | Refills: 2 | Status: SHIPPED | OUTPATIENT
Start: 2023-04-27 | End: 2023-05-27

## 2023-04-27 NOTE — PROGRESS NOTES
"Chief Complaint  Chief Complaint   Patient presents with    Follow-up    Medication Refill       HPI    Patient presents to clinic today for a follow up.  Last visit was about one month ago where his Restoril dose was increase\d from 15mg to 30mg and he was started on Cymbalta. Pt denies any side effects from the Cymbalta.  He reports that he is feeling a "little better"  He reports feeling frustrated and angry at times when he knows there is nothing to be frustrated or angry with.      Restoril: pt reports that he is sleeping well most nights.  Sometimes he is awake for 1-2 days, but he reports that his sleep is ok.    Cymbalta: pt states that initially he was reluctant to start the Cymbalta, so he has been taking it for one week.  He is not sure if it is helping and feels like he needs to take it a little longer to be able to evaluate the effectiveness.      Atomoxetine: pt reports that he took one dose (40mg) and felt awful, so he has not taken since.     Follow-up  Pertinent negatives include no headaches.   Medication Refill  Pertinent negatives include no headaches.       PAST MEDICAL HISTORY:  Past Medical History:   Diagnosis Date    Allergy     Hyperlipidemia        PAST SURGICAL HISTORY:  Past Surgical History:   Procedure Laterality Date    SINUS SURGERY         SOCIAL HISTORY:  Social History     Socioeconomic History    Marital status: Single   Occupational History    Occupation:    Tobacco Use    Smoking status: Never    Smokeless tobacco: Never   Substance and Sexual Activity    Alcohol use: Yes     Alcohol/week: 2.0 standard drinks     Types: 1 Cans of beer, 1 Standard drinks or equivalent per week     Comment: very rarely    Drug use: No    Sexual activity: Yes     Partners: Female     Birth control/protection: Condom       FAMILY HISTORY:  Family History   Problem Relation Age of Onset    Arthritis Mother     Heart disease Maternal Grandmother     Heart disease Maternal Grandfather     " Heart disease Paternal Grandmother     Heart disease Paternal Grandfather        ALLERGIES AND MEDICATIONS: updated and reviewed.  Review of patient's allergies indicates:  No Known Allergies  Current Outpatient Medications   Medication Sig Dispense Refill    DULoxetine (CYMBALTA) 30 MG capsule Take 1 capsule (30 mg total) by mouth once daily. 30 capsule 11    gabapentin (NEURONTIN) 300 MG capsule Take 1 capsule by mouth every evening.      ibuprofen (ADVIL,MOTRIN) 800 MG tablet Take 1 tablet (800 mg total) by mouth every 6 (six) hours as needed for Pain. Take with food 60 tablet 1    temazepam (RESTORIL) 30 mg capsule Take 1 capsule (30 mg total) by mouth nightly as needed for Insomnia. 30 capsule 2     No current facility-administered medications for this visit.         ROS  Review of Systems   Constitutional: Negative.    HENT: Negative.     Eyes: Negative.    Respiratory: Negative.     Cardiovascular: Negative.    Gastrointestinal: Negative.    Endocrine: Negative.    Genitourinary: Negative.    Musculoskeletal: Negative.    Skin: Negative.    Allergic/Immunologic: Negative.    Neurological: Negative.  Negative for headaches.   Hematological: Negative.    Psychiatric/Behavioral:  Positive for behavioral problems and sleep disturbance. The patient is nervous/anxious.          PHYSICAL EXAM  Vitals:    04/27/23 0839   BP: 102/62   BP Location: Left arm   Patient Position: Sitting   BP Method: Medium (Manual)   Pulse: (!) 59   Resp: 16   Temp: 96.9 °F (36.1 °C)   TempSrc: Temporal   SpO2: 98%   Weight: 84.8 kg (186 lb 13.4 oz)   Height: 6' (1.829 m)    Body mass index is 25.34 kg/m².  Weight: 84.8 kg (186 lb 13.4 oz)   Height: 6' (182.9 cm)     Physical Exam  HENT:      Head: Normocephalic.      Mouth/Throat:      Mouth: Mucous membranes are moist.   Cardiovascular:      Rate and Rhythm: Normal rate and regular rhythm.   Pulmonary:      Effort: Pulmonary effort is normal.   Abdominal:      General: Abdomen is flat.    Musculoskeletal:         General: Normal range of motion.      Cervical back: Normal range of motion.   Skin:     General: Skin is warm.      Capillary Refill: Capillary refill takes less than 2 seconds.   Neurological:      Mental Status: He is alert and oriented to person, place, and time.   Psychiatric:         Attention and Perception: Attention normal.         Mood and Affect: Affect is flat.         Speech: Speech normal.         Thought Content: Thought content normal.         Cognition and Memory: Cognition normal.         Judgment: Judgment normal.         Health Maintenance         Date Due Completion Date    Hemoglobin A1c (Diabetic Prevention Screening) Never done ---    Colorectal Cancer Screening Never done ---    Influenza Vaccine (1) 06/30/2023 (Originally 9/1/2022) 11/23/2015    Shingles Vaccine (1 of 2) 03/27/2024 (Originally 8/17/2022) ---    COVID-19 Vaccine (1) 03/27/2024 (Originally 2/17/1971) ---    Lipid Panel 03/27/2028 3/27/2023    TETANUS VACCINE 07/09/2029 7/9/2019              Assessment & Plan    Problem List Items Addressed This Visit          Psychiatric    PTSD (post-traumatic stress disorder)    Relevant Medications    temazepam (RESTORIL) 30 mg capsule    Will continue on current doses of Restoril and Cymbalta and re-evaluate in 3 months.  Instructed patient he may need to increase cymbalta to average dose of 60 mg.       Other Relevant Orders    Ambulatory referral/consult to Primary Care Behavioral Health (Non-Opioids)    Pt referred to see LCSW to help with coping mechanisms in conjunction with medications.  Pt agreeable.     I have reviewed the notes, assessments, and/or procedures performed by this student, I concur with her/his documentation.      Follow-up: in 3 months.  Follow up in about 3 months (around 7/27/2023).    Yesika Allen    Medication List with Changes/Refills   Current Medications    DULOXETINE (CYMBALTA) 30 MG CAPSULE    Take 1 capsule (30 mg  total) by mouth once daily.    GABAPENTIN (NEURONTIN) 300 MG CAPSULE    Take 1 capsule by mouth every evening.    IBUPROFEN (ADVIL,MOTRIN) 800 MG TABLET    Take 1 tablet (800 mg total) by mouth every 6 (six) hours as needed for Pain. Take with food   Changed and/or Refilled Medications    Modified Medication Previous Medication    TEMAZEPAM (RESTORIL) 30 MG CAPSULE temazepam (RESTORIL) 30 mg capsule       Take 1 capsule (30 mg total) by mouth nightly as needed for Insomnia.    Take 1 capsule (30 mg total) by mouth nightly as needed for Insomnia.

## 2023-04-28 ENCOUNTER — TELEPHONE (OUTPATIENT)
Dept: BEHAVIORAL HEALTH | Facility: CLINIC | Age: 53
End: 2023-04-28
Payer: COMMERCIAL

## 2023-04-28 NOTE — PROGRESS NOTES
CHW reached out to pt to schedule a new patient virtual visit with Ramon Navarrete LPC and he said no I don't want a virtual visit. CHW offered to schedule an office visit for patient with Lucero Austin LCSW and she said no absolutely not and hung up the phone.

## 2023-07-05 ENCOUNTER — PATIENT OUTREACH (OUTPATIENT)
Dept: ADMINISTRATIVE | Facility: HOSPITAL | Age: 53
End: 2023-07-05
Payer: COMMERCIAL

## 2023-07-05 NOTE — PROGRESS NOTES
Health Maintenance Due   Topic Date Due    Hemoglobin A1c (Diabetic Prevention Screening)  Never done    Colorectal Cancer Screening  Never done        Chart review done.   HM updated.   Immunizations reviewed & updated.   Care Everywhere updated.

## 2023-09-18 ENCOUNTER — PATIENT MESSAGE (OUTPATIENT)
Dept: PRIMARY CARE CLINIC | Facility: CLINIC | Age: 53
End: 2023-09-18
Payer: COMMERCIAL

## 2023-09-19 ENCOUNTER — PATIENT OUTREACH (OUTPATIENT)
Dept: ADMINISTRATIVE | Facility: HOSPITAL | Age: 53
End: 2023-09-19
Payer: COMMERCIAL

## 2023-09-22 ENCOUNTER — PATIENT MESSAGE (OUTPATIENT)
Dept: ADMINISTRATIVE | Facility: HOSPITAL | Age: 53
End: 2023-09-22
Payer: COMMERCIAL

## 2023-09-22 ENCOUNTER — PATIENT OUTREACH (OUTPATIENT)
Dept: ADMINISTRATIVE | Facility: HOSPITAL | Age: 53
End: 2023-09-22
Payer: COMMERCIAL

## 2023-09-22 NOTE — PROGRESS NOTES
Health Maintenance Due   Topic Date Due    Hemoglobin A1c (Diabetic Prevention Screening)  Never done    Colorectal Cancer Screening  Never done    Influenza Vaccine (1) 09/01/2023     Immunizations - reviewed and updated   Care Everywhere - triggered   Care Teams - updated   Outreach - Cologuard kit ordered on 3/27/2023, no results found in chart. Portal message sent to patient in regards to completing and mailing back.

## 2023-09-26 ENCOUNTER — PATIENT MESSAGE (OUTPATIENT)
Dept: ORTHOPEDICS | Facility: CLINIC | Age: 53
End: 2023-09-26

## 2023-09-26 ENCOUNTER — OFFICE VISIT (OUTPATIENT)
Dept: ORTHOPEDICS | Facility: CLINIC | Age: 53
End: 2023-09-26
Payer: COMMERCIAL

## 2023-09-26 VITALS
HEART RATE: 63 BPM | BODY MASS INDEX: 24.5 KG/M2 | HEIGHT: 72 IN | WEIGHT: 180.88 LBS | SYSTOLIC BLOOD PRESSURE: 121 MMHG | DIASTOLIC BLOOD PRESSURE: 77 MMHG

## 2023-09-26 DIAGNOSIS — M25.521 RIGHT ELBOW PAIN: Primary | ICD-10-CM

## 2023-09-26 DIAGNOSIS — S46.101A INJURY OF TENDON OF LONG HEAD OF RIGHT BICEPS, INITIAL ENCOUNTER: Primary | ICD-10-CM

## 2023-09-26 PROCEDURE — 3078F DIAST BP <80 MM HG: CPT | Mod: CPTII,S$GLB,,

## 2023-09-26 PROCEDURE — 99214 OFFICE O/P EST MOD 30 MIN: CPT | Mod: S$GLB,,,

## 2023-09-26 PROCEDURE — 3008F PR BODY MASS INDEX (BMI) DOCUMENTED: ICD-10-PCS | Mod: CPTII,S$GLB,,

## 2023-09-26 PROCEDURE — 3074F PR MOST RECENT SYSTOLIC BLOOD PRESSURE < 130 MM HG: ICD-10-PCS | Mod: CPTII,S$GLB,,

## 2023-09-26 PROCEDURE — 3008F BODY MASS INDEX DOCD: CPT | Mod: CPTII,S$GLB,,

## 2023-09-26 PROCEDURE — 1159F PR MEDICATION LIST DOCUMENTED IN MEDICAL RECORD: ICD-10-PCS | Mod: CPTII,S$GLB,,

## 2023-09-26 PROCEDURE — 99214 PR OFFICE/OUTPT VISIT, EST, LEVL IV, 30-39 MIN: ICD-10-PCS | Mod: S$GLB,,,

## 2023-09-26 PROCEDURE — 1159F MED LIST DOCD IN RCRD: CPT | Mod: CPTII,S$GLB,,

## 2023-09-26 PROCEDURE — 99999 PR PBB SHADOW E&M-EST. PATIENT-LVL III: ICD-10-PCS | Mod: PBBFAC,,,

## 2023-09-26 PROCEDURE — 3078F PR MOST RECENT DIASTOLIC BLOOD PRESSURE < 80 MM HG: ICD-10-PCS | Mod: CPTII,S$GLB,,

## 2023-09-26 PROCEDURE — 3074F SYST BP LT 130 MM HG: CPT | Mod: CPTII,S$GLB,,

## 2023-09-26 PROCEDURE — 99999 PR PBB SHADOW E&M-EST. PATIENT-LVL III: CPT | Mod: PBBFAC,,,

## 2023-09-26 RX ORDER — METHYLPREDNISOLONE 4 MG/1
TABLET ORAL
Qty: 21 EACH | Refills: 0 | Status: SHIPPED | OUTPATIENT
Start: 2023-09-26 | End: 2023-10-17

## 2023-09-26 RX ORDER — TEMAZEPAM 30 MG/1
CAPSULE ORAL
COMMUNITY
Start: 2023-08-31 | End: 2023-11-06

## 2023-09-26 NOTE — PROGRESS NOTES
Patient ID: Jonathan Srinivasan is a 53 y.o. male    Pain of the Right Elbow      History of Present Illness:    Jonathan Srinivasan presents to clinic for right elbow pain. Patient reports his friend fell on his elbow and his arm twisted, about 2 weeks prior. The pain started 2 weeks ago and is becoming progressively worse.  Pain is located over (points to) anterior elbow. He reports that the pain is a 0 /10 aching pain toda. The pain is affecting ADLs and limiting desired level of activity. Denies numbness, tingling, radiation and inability to bear weight.  Reports pulling worsens pain.     He has tried aleve and biofreeze with some improvement.     Occupation: self employed    Ambulating: unassisted  Diabetic: no  Smoking: no  Hx of DVT/PE: no    PAST MEDICAL HISTORY:   Past Medical History:   Diagnosis Date    Allergy     Hyperlipidemia      PAST SURGICAL HISTORY:   Past Surgical History:   Procedure Laterality Date    SINUS SURGERY       FAMILY HISTORY:   Family History   Problem Relation Age of Onset    Arthritis Mother     Heart disease Maternal Grandmother     Heart disease Maternal Grandfather     Heart disease Paternal Grandmother     Heart disease Paternal Grandfather      SOCIAL HISTORY:   Social History     Occupational History    Occupation: [employer]   Tobacco Use    Smoking status: Never    Smokeless tobacco: Never   Substance and Sexual Activity    Alcohol use: Yes     Alcohol/week: 2.0 standard drinks of alcohol     Types: 1 Cans of beer, 1 Standard drinks or equivalent per week     Comment: very rarely    Drug use: No    Sexual activity: Yes     Partners: Female     Birth control/protection: Condom        MEDICATIONS:   Current Outpatient Medications:     DULoxetine (CYMBALTA) 30 MG capsule, Take 1 capsule (30 mg total) by mouth once daily., Disp: 30 capsule, Rfl: 11    ibuprofen (ADVIL,MOTRIN) 800 MG tablet, Take 1 tablet (800 mg total) by mouth every 6 (six) hours as needed for Pain. Take with food,  Disp: 60 tablet, Rfl: 1    temazepam (RESTORIL) 30 mg capsule, , Disp: , Rfl:     gabapentin (NEURONTIN) 300 MG capsule, Take 1 capsule by mouth every evening., Disp: , Rfl:     methylPREDNISolone (MEDROL DOSEPACK) 4 mg tablet, use as directed, Disp: 21 each, Rfl: 0  ALLERGIES: Review of patient's allergies indicates:  No Known Allergies      Physical Exam     Vitals:    09/26/23 1540   BP: 121/77   Pulse: 63     Alert and oriented to person, place and time. No acute distress. Well-groomed, not ill appearing. Pupils round and reactive, normal respiratory effort, no audible wheezing.     TENDERNESS / CREPITUS          T / C        Medial epicondyle   none  Med. (Ulnar) collateral ligament  none  Flexor pronator Musculature   none  Biceps tendon    T  Head of radius    none    Lateral epicondyle   none   Extensor Musculature   none  Triceps tendon   none  Olecranon    none   Cubital fossa    none  Radial tunnel    none             Range of Motion   Right Elbow  AROM (PROM)     Extension   0 deg  (5 deg)   Flexion   145 deg (145 deg)         Pronation  90 deg  (90 deg)      Supination   80 deg  (80 deg)                 Left Elbow  AROM (PROM)     Extension   0 deg  (5 deg)   Flexion   145 deg (145 deg)         Pronation  90 deg  (90 deg)      Supination   80 deg  (80 deg)      Strength:   Elbow Flexion:   5/5  Elbow Extension:  5/5    ELBOW EXAMINATION:  Tinel's (Percussion) Test - Cubital  neg  Tennis Elbow Test    neg  Golfer's Elbow Test    neg  Resisted Long Finger Extension Pain neg    Imaging:     Right elbow X-rays ordered/reviewed by me showing no evidence of fracture or dislocation. There is no obvious malalignment. No evidence of masses, lesions or foreign bodies.     Assessment & Plan    Injury of tendon of long head of right biceps, initial encounter  -     methylPREDNISolone (MEDROL DOSEPACK) 4 mg tablet; use as directed  Dispense: 21 each; Refill: 0         I made the decision to obtain old records of the  patient including previous notes and imaging. New imaging was ordered today of the extremity or extremities evaluated. I independently reviewed and interpreted the radiographs and/or MRIs/CT scan today as well as prior imaging.    We discussed at length different treatment options including conservative vs surgical management. These include anti-inflammatories, acetaminophen, rest, ice, heat, formal physical therapy including strengthening and stretching exercises, home exercise programs, injections, dry needling, and finally surgical intervention.      Patient with right elbow pain after sustaining an injury roughly 2 weeks ago.  On exam there is low suspicion for distal biceps rupture.  He is mildly tender in this area.  I would like to trial a Medrol Dosepak and see if this improves.  If not, we will obtain MRI.  Medrol Dosepak sent, take as instructed.  He will contact the clinic if no improvement and we will order MRI.    Follow up: pending efficacy of medrol dose pack  X-rays next visit: none    All questions were answered and patient is agreeable to the above plan.

## 2023-10-18 ENCOUNTER — PATIENT MESSAGE (OUTPATIENT)
Dept: CARDIOLOGY | Facility: CLINIC | Age: 53
End: 2023-10-18
Payer: COMMERCIAL

## 2023-10-23 ENCOUNTER — OFFICE VISIT (OUTPATIENT)
Dept: UROLOGY | Facility: CLINIC | Age: 53
End: 2023-10-23
Payer: COMMERCIAL

## 2023-10-23 VITALS
DIASTOLIC BLOOD PRESSURE: 80 MMHG | HEART RATE: 76 BPM | WEIGHT: 180.25 LBS | SYSTOLIC BLOOD PRESSURE: 131 MMHG | BODY MASS INDEX: 24.44 KG/M2

## 2023-10-23 DIAGNOSIS — Z20.2 POTENTIAL EXPOSURE TO STD: ICD-10-CM

## 2023-10-23 DIAGNOSIS — R39.14 FEELING OF INCOMPLETE BLADDER EMPTYING: ICD-10-CM

## 2023-10-23 DIAGNOSIS — R35.0 FREQUENCY OF URINATION: Primary | ICD-10-CM

## 2023-10-23 DIAGNOSIS — R35.1 NOCTURIA: ICD-10-CM

## 2023-10-23 LAB
BACTERIA #/AREA URNS HPF: NORMAL /HPF
BILIRUB SERPL-MCNC: NEGATIVE MG/DL
BLOOD URINE, POC: NEGATIVE
CLARITY, POC UA: CLEAR
COLOR, POC UA: YELLOW
GLUCOSE UR QL STRIP: NEGATIVE
KETONES UR QL STRIP: NEGATIVE
LEUKOCYTE ESTERASE URINE, POC: NEGATIVE
MICROSCOPIC COMMENT: NORMAL
NITRITE, POC UA: NEGATIVE
PH, POC UA: 6
POC RESIDUAL URINE VOLUME: 0 ML (ref 0–100)
PROTEIN, POC: NORMAL
RBC #/AREA URNS HPF: 0 /HPF (ref 0–4)
SPECIFIC GRAVITY, POC UA: 1.01
UROBILINOGEN, POC UA: NORMAL
WBC #/AREA URNS HPF: 0 /HPF (ref 0–5)

## 2023-10-23 PROCEDURE — 87661 TRICHOMONAS VAGINALIS AMPLIF: CPT | Performed by: NURSE PRACTITIONER

## 2023-10-23 PROCEDURE — 3075F PR MOST RECENT SYSTOLIC BLOOD PRESS GE 130-139MM HG: ICD-10-PCS | Mod: CPTII,S$GLB,, | Performed by: NURSE PRACTITIONER

## 2023-10-23 PROCEDURE — 3008F PR BODY MASS INDEX (BMI) DOCUMENTED: ICD-10-PCS | Mod: CPTII,S$GLB,, | Performed by: NURSE PRACTITIONER

## 2023-10-23 PROCEDURE — 99999 PR PBB SHADOW E&M-EST. PATIENT-LVL III: ICD-10-PCS | Mod: PBBFAC,,, | Performed by: NURSE PRACTITIONER

## 2023-10-23 PROCEDURE — 1160F RVW MEDS BY RX/DR IN RCRD: CPT | Mod: CPTII,S$GLB,, | Performed by: NURSE PRACTITIONER

## 2023-10-23 PROCEDURE — 81002 URINALYSIS NONAUTO W/O SCOPE: CPT | Mod: S$GLB,,, | Performed by: NURSE PRACTITIONER

## 2023-10-23 PROCEDURE — 81001 URINALYSIS AUTO W/SCOPE: CPT | Performed by: NURSE PRACTITIONER

## 2023-10-23 PROCEDURE — 81002 POCT URINE DIPSTICK WITHOUT MICROSCOPE: ICD-10-PCS | Mod: S$GLB,,, | Performed by: NURSE PRACTITIONER

## 2023-10-23 PROCEDURE — 99214 PR OFFICE/OUTPT VISIT, EST, LEVL IV, 30-39 MIN: ICD-10-PCS | Mod: S$GLB,,, | Performed by: NURSE PRACTITIONER

## 2023-10-23 PROCEDURE — 1159F PR MEDICATION LIST DOCUMENTED IN MEDICAL RECORD: ICD-10-PCS | Mod: CPTII,S$GLB,, | Performed by: NURSE PRACTITIONER

## 2023-10-23 PROCEDURE — 51798 US URINE CAPACITY MEASURE: CPT | Mod: S$GLB,,, | Performed by: NURSE PRACTITIONER

## 2023-10-23 PROCEDURE — 99999 PR PBB SHADOW E&M-EST. PATIENT-LVL III: CPT | Mod: PBBFAC,,, | Performed by: NURSE PRACTITIONER

## 2023-10-23 PROCEDURE — 99214 OFFICE O/P EST MOD 30 MIN: CPT | Mod: S$GLB,,, | Performed by: NURSE PRACTITIONER

## 2023-10-23 PROCEDURE — 3075F SYST BP GE 130 - 139MM HG: CPT | Mod: CPTII,S$GLB,, | Performed by: NURSE PRACTITIONER

## 2023-10-23 PROCEDURE — 1159F MED LIST DOCD IN RCRD: CPT | Mod: CPTII,S$GLB,, | Performed by: NURSE PRACTITIONER

## 2023-10-23 PROCEDURE — 87086 URINE CULTURE/COLONY COUNT: CPT | Performed by: NURSE PRACTITIONER

## 2023-10-23 PROCEDURE — 3079F DIAST BP 80-89 MM HG: CPT | Mod: CPTII,S$GLB,, | Performed by: NURSE PRACTITIONER

## 2023-10-23 PROCEDURE — 51798 POCT BLADDER SCAN: ICD-10-PCS | Mod: S$GLB,,, | Performed by: NURSE PRACTITIONER

## 2023-10-23 PROCEDURE — 3008F BODY MASS INDEX DOCD: CPT | Mod: CPTII,S$GLB,, | Performed by: NURSE PRACTITIONER

## 2023-10-23 PROCEDURE — 1160F PR REVIEW ALL MEDS BY PRESCRIBER/CLIN PHARMACIST DOCUMENTED: ICD-10-PCS | Mod: CPTII,S$GLB,, | Performed by: NURSE PRACTITIONER

## 2023-10-23 PROCEDURE — 3079F PR MOST RECENT DIASTOLIC BLOOD PRESSURE 80-89 MM HG: ICD-10-PCS | Mod: CPTII,S$GLB,, | Performed by: NURSE PRACTITIONER

## 2023-10-23 PROCEDURE — 87591 N.GONORRHOEAE DNA AMP PROB: CPT | Performed by: NURSE PRACTITIONER

## 2023-10-23 NOTE — PROGRESS NOTES
Reason for Call:  Other prescription    Detailed comments: patient and his wife are going on a cruise through the [place] in January.  The last time they did this they were given a prescription for 6 Azythromycin to help with diarrhea and uri.  He is asking that you send a prescription to [facility] for this.    Phone Number Patient can be reached at: Home number on file [phone] (home)    Best Time: any    Can we leave a detailed message on this number? YES    Call taken on 12/4/2017 at 2:33 PM by Loy Rutledge       Subjective:      Jonathan Srinivasan is a 53 y.o. male who presents for evaluation of bladder pain.      Patient complains of lower urinary tract symptoms. He reports frequency, incomplete emptying, nocturia two times a night, and urgency. He denies straining and weak stream. Patient states symptoms are of moderate severity. Onset of symptoms was several months ago and was unknown in onset. His AUA Symptom Score is, 12/3. He has no personal history and no family history of prostate cancer. He denies flank pain, gross hematuria, kidney stones, and recurrent UTI.  Denies snoring; has restless leg syndrome- sleep study negative for sleep apnea     He also reports trauma to penis several weeks ago; denies bruising, swelling, GH, hematospermia; reports some mild tenderness   He also requests STD testing     The following portions of the patient's history were reviewed and updated as appropriate: allergies, current medications, past family history, past medical history, past social history, past surgical history and problem list.    Review of Systems  Constitutional: no fever or chills  ENT: no nasal congestion or sore throat  Respiratory: no cough or shortness of breath  Cardiovascular: no chest pain or palpitations  Gastrointestinal: no nausea or vomiting, tolerating diet  Genitourinary: as per HPI  Hematologic/Lymphatic: no easy bruising or lymphadenopathy  Musculoskeletal: no arthralgias or myalgias  Neurological: no seizures or tremors  Behavioral/Psych: no auditory or visual hallucinations     Objective:   Vitals: /80   Pulse 76   Wt 81.8 kg (180 lb 3.6 oz)   BMI 24.44 kg/m²     Physical Exam   General: alert and oriented, no acute distress  Respiratory: Symmetric expansion, non-labored breathing  Cardiovascular: regular rate and rhythm, nomal pulses, no peripheral edema  Abdomen: soft, non tender  Skin: normal coloration and turgor, no rashes, no suspicious skin lesions noted  Neuro: alert and oriented x3, no  gross deficits  Psych: normal judgment and insight, normal mood/affect, and non-anxious    Physical Exam    Lab Review   Urinalysis demonstrates positive for protein  Lab Results   Component Value Date    WBC 3.97 03/27/2023    HGB 15.2 03/27/2023    HCT 46.8 03/27/2023    MCV 91 03/27/2023     03/27/2023     Lab Results   Component Value Date    CREATININE 0.9 03/27/2023    BUN 15 03/27/2023     Lab Results   Component Value Date    PSA 0.48 03/27/2023     Bladder Scan PVR: 0 cc     Assessment and Plan:   1. Frequency of urination  --discussed trial of OAB medication; pt wishes to avoid medication at this time  --limit bladder irritants/start bladder diary   - Urine culture  - Urinalysis Microscopic    2. Feeling of incomplete bladder emptying  --PVR 0 cc    3. Nocturia  --limit PM fluid intake     4. Potential exposure to STD  - C. trachomatis/N. gonorrhoeae by AMP DNA [facility]; Urine  - Trichomonas vaginalis, RNA, Qual, Urine  - HIV 1/2 Ag/Ab (4th Gen); Future  - HEPATITIS C ANTIBODY; Future  - RPR; Future     --will notify with results  --pt will notify office if he would like to trial OAB medication     This note is dictated on M*Modal word recognition program.  There are word recognition mistakes that are occasionally missed on review.

## 2023-10-25 LAB
BACTERIA UR CULT: NO GROWTH
C TRACH DNA SPEC QL NAA+PROBE: NOT DETECTED
N GONORRHOEA DNA SPEC QL NAA+PROBE: NOT DETECTED

## 2023-10-27 ENCOUNTER — TELEPHONE (OUTPATIENT)
Dept: UROLOGY | Facility: CLINIC | Age: 53
End: 2023-10-27
Payer: COMMERCIAL

## 2023-10-27 LAB
SPECIMEN SOURCE: NORMAL
T VAGINALIS RRNA SPEC QL NAA+PROBE: NEGATIVE

## 2023-10-27 NOTE — TELEPHONE ENCOUNTER
----- Message from Eneida Garcia sent at 10/27/2023  9:30 AM CDT -----  Regarding: results  Contact: [phone]  Pt needing tests results from 10/23/23. Kingsley puga

## 2023-10-27 NOTE — TELEPHONE ENCOUNTER
Informed patient about test results.  Answered all of patient questions.  Patient verbalized understanding.    CATARINA Goddard

## 2023-11-06 RX ORDER — TEMAZEPAM 30 MG/1
30 CAPSULE ORAL NIGHTLY PRN
Qty: 30 CAPSULE | Refills: 0 | Status: SHIPPED | OUTPATIENT
Start: 2023-11-06 | End: 2023-12-14

## 2023-12-14 RX ORDER — TEMAZEPAM 30 MG/1
30 CAPSULE ORAL NIGHTLY PRN
Qty: 30 CAPSULE | Refills: 0 | Status: SHIPPED | OUTPATIENT
Start: 2023-12-14 | End: 2024-01-18

## 2023-12-14 NOTE — TELEPHONE ENCOUNTER
Care Due:                  Date            Visit Type   Department     Provider  --------------------------------------------------------------------------------                                EP -                              PRIMARY      [facility] [place] [facility]  Last Visit: 08-      CARE ([facility])   PRIMARY CARE   Vincenzo Meng  Next Visit: None Scheduled  None         None Found                                                            Last  Test          Frequency    Reason                     Performed    Due Date  --------------------------------------------------------------------------------    Office Visit  12 months..  ibuprofen................  08- 08-    [facility] Embedded Care Due Messages. Reference number: [id number].   12/13/2023 6:56:39 PM CST

## 2024-01-17 NOTE — TELEPHONE ENCOUNTER
Care Due:                  Date            Visit Type   Department     Provider  --------------------------------------------------------------------------------                                EP -                              PRIMARY      [facility] [place] [place] [facility]  Last Visit: 08-      CARE ([facility])   PRIMARY CARE   Vincenzo Meng  Next Visit: None Scheduled  None         None Found                                                            Last  Test          Frequency    Reason                     Performed    Due Date  --------------------------------------------------------------------------------    CBC.........  12 months..  ibuprofen................  03- 03-    Cr..........  12 months..  ibuprofen................  03- 03-    [employer] Embedded Care Due Messages. Reference number: [id number].   1/17/2024 5:51:15 PM CST

## 2024-01-18 RX ORDER — TEMAZEPAM 30 MG/1
30 CAPSULE ORAL NIGHTLY PRN
Qty: 30 CAPSULE | Refills: 0 | Status: SHIPPED | OUTPATIENT
Start: 2024-01-18 | End: 2024-02-29 | Stop reason: SDUPTHER

## 2024-01-20 NOTE — TELEPHONE ENCOUNTER
No care due was identified.  [facility] Embedded Care Due Messages. Reference number: [id number].   1/19/2024 6:40:02 PM CST

## 2024-01-22 RX ORDER — TEMAZEPAM 30 MG/1
30 CAPSULE ORAL NIGHTLY PRN
Qty: 30 CAPSULE | Refills: 0 | OUTPATIENT
Start: 2024-01-22

## 2024-02-20 RX ORDER — TEMAZEPAM 30 MG/1
30 CAPSULE ORAL NIGHTLY PRN
Qty: 30 CAPSULE | Refills: 0 | OUTPATIENT
Start: 2024-02-20

## 2024-02-20 NOTE — TELEPHONE ENCOUNTER
Care Due:                  Date            Visit Type   Department     Provider  --------------------------------------------------------------------------------                                EP -                              PRIMARY      [facility]  Last Visit: 08-      CARE ([facility])   PRIMARY CARE   Vincenzo Meng  Next Visit: None Scheduled  None         None Found                                                            Last  Test          Frequency    Reason                     Performed    Due Date  --------------------------------------------------------------------------------    Office Visit  12 months..  ibuprofen................  08- 08-    [facility] Embedded Care Due Messages. Reference number: [id number].   2/20/2024 9:24:16 AM CST

## 2024-02-22 NOTE — TELEPHONE ENCOUNTER
No care due was identified.  [facility] Embedded Care Due Messages. Reference number: [id number].   2/22/2024 5:28:51 PM CST

## 2024-02-23 ENCOUNTER — PATIENT MESSAGE (OUTPATIENT)
Dept: PRIMARY CARE CLINIC | Facility: CLINIC | Age: 54
End: 2024-02-23
Payer: COMMERCIAL

## 2024-02-23 RX ORDER — TEMAZEPAM 30 MG/1
30 CAPSULE ORAL NIGHTLY PRN
Qty: 30 CAPSULE | Refills: 0 | OUTPATIENT
Start: 2024-02-23

## 2024-02-23 NOTE — TELEPHONE ENCOUNTER
Please remind patient he needs to be seen every 6 months for benzodiazepine refill.  Please have him make an appointment.

## 2024-02-27 RX ORDER — TEMAZEPAM 30 MG/1
30 CAPSULE ORAL NIGHTLY PRN
Qty: 30 CAPSULE | Refills: 0 | OUTPATIENT
Start: 2024-02-27

## 2024-02-28 ENCOUNTER — PATIENT MESSAGE (OUTPATIENT)
Dept: PRIMARY CARE CLINIC | Facility: CLINIC | Age: 54
End: 2024-02-28
Payer: COMMERCIAL

## 2024-02-28 RX ORDER — TEMAZEPAM 30 MG/1
30 CAPSULE ORAL NIGHTLY PRN
Qty: 30 CAPSULE | Refills: 0 | OUTPATIENT
Start: 2024-02-28

## 2024-02-28 NOTE — TELEPHONE ENCOUNTER
No care due was identified.  [facility] Embedded Care Due Messages. Reference number: [id number].   2/28/2024 4:10:26 PM CST

## 2024-02-29 RX ORDER — TEMAZEPAM 30 MG/1
30 CAPSULE ORAL NIGHTLY PRN
Qty: 30 CAPSULE | Refills: 0 | Status: SHIPPED | OUTPATIENT
Start: 2024-02-29 | End: 2024-03-20 | Stop reason: SDUPTHER

## 2024-02-29 NOTE — TELEPHONE ENCOUNTER
No care due was identified.  [facility] Embedded Care Due Messages. Reference number: [id number].   2/29/2024 11:01:34 AM CST

## 2024-02-29 NOTE — TELEPHONE ENCOUNTER
----- Message from Juliana Ramirez MA sent at 2/29/2024 11:00 AM CST -----  Contact: Mobile  [phone]    ----- Message -----  From: Kelsie Lester  Sent: 2/29/2024  10:45 AM CST  To: Alejandro Dee Staff    Requesting an RX refill or new RX.  Is this a refill or new RX: Refill  RX name and strength temazepam (RESTORIL) 30 mg capsule  Is this a 30 day or 90 day RX: 30  Pharmacy name and phone #   [id number] - [place] - [address]  [address]  [place] [address]  Phone: [phone] Fax: [phone]  The doctors have asked that we provide their patients with the following 2 reminders -- prescription refills can take up to 72 hours, and a friendly reminder that in the future you can use your [facility] account to request refills:   Comment: Patient said that he does not have insurance and he will pay out of pocket.

## 2024-03-20 ENCOUNTER — TELEPHONE (OUTPATIENT)
Dept: PRIMARY CARE CLINIC | Facility: CLINIC | Age: 54
End: 2024-03-20

## 2024-03-20 ENCOUNTER — OFFICE VISIT (OUTPATIENT)
Dept: PRIMARY CARE CLINIC | Facility: CLINIC | Age: 54
End: 2024-03-20

## 2024-03-20 VITALS
OXYGEN SATURATION: 98 % | WEIGHT: 188.19 LBS | BODY MASS INDEX: 25.49 KG/M2 | RESPIRATION RATE: 17 BRPM | SYSTOLIC BLOOD PRESSURE: 110 MMHG | HEIGHT: 72 IN | HEART RATE: 94 BPM | DIASTOLIC BLOOD PRESSURE: 78 MMHG | TEMPERATURE: 98 F

## 2024-03-20 DIAGNOSIS — Z00.00 ANNUAL PHYSICAL EXAM: Primary | ICD-10-CM

## 2024-03-20 DIAGNOSIS — F43.10 PTSD (POST-TRAUMATIC STRESS DISORDER): ICD-10-CM

## 2024-03-20 DIAGNOSIS — F51.01 PRIMARY INSOMNIA: ICD-10-CM

## 2024-03-20 DIAGNOSIS — E78.2 MODERATE MIXED HYPERLIPIDEMIA NOT REQUIRING STATIN THERAPY: ICD-10-CM

## 2024-03-20 DIAGNOSIS — F33.1 MDD (MAJOR DEPRESSIVE DISORDER), RECURRENT EPISODE, MODERATE: ICD-10-CM

## 2024-03-20 DIAGNOSIS — N52.2 DRUG-INDUCED ERECTILE DYSFUNCTION: ICD-10-CM

## 2024-03-20 PROCEDURE — 99214 OFFICE O/P EST MOD 30 MIN: CPT | Mod: S$PBB,,, | Performed by: NURSE PRACTITIONER

## 2024-03-20 PROCEDURE — 99999 PR PBB SHADOW E&M-EST. PATIENT-LVL III: CPT | Mod: PBBFAC,,, | Performed by: NURSE PRACTITIONER

## 2024-03-20 PROCEDURE — 99213 OFFICE O/P EST LOW 20 MIN: CPT | Mod: PBBFAC,PN | Performed by: NURSE PRACTITIONER

## 2024-03-20 RX ORDER — FLUTICASONE PROPIONATE 50 UG/1
POWDER, METERED RESPIRATORY (INHALATION)
COMMUNITY

## 2024-03-20 RX ORDER — DULOXETIN HYDROCHLORIDE 30 MG/1
30 CAPSULE, DELAYED RELEASE ORAL DAILY
Qty: 90 CAPSULE | Refills: 1 | Status: SHIPPED | OUTPATIENT
Start: 2024-03-20 | End: 2025-03-20

## 2024-03-20 RX ORDER — AZELASTINE 1 MG/ML
1 SPRAY, METERED NASAL 2 TIMES DAILY
COMMUNITY

## 2024-03-20 RX ORDER — SILDENAFIL 100 MG/1
100 TABLET, FILM COATED ORAL DAILY PRN
Qty: 30 TABLET | Refills: 0 | Status: SHIPPED | OUTPATIENT
Start: 2024-03-20 | End: 2025-03-20

## 2024-03-20 RX ORDER — ALPRAZOLAM 0.5 MG/1
0.5 TABLET ORAL DAILY PRN
Qty: 15 TABLET | Refills: 0 | Status: SHIPPED | OUTPATIENT
Start: 2024-03-20 | End: 2024-05-15

## 2024-03-20 RX ORDER — ONDANSETRON 4 MG/1
4 TABLET, ORALLY DISINTEGRATING ORAL 2 TIMES DAILY
Qty: 15 TABLET | Refills: 0 | Status: SHIPPED | OUTPATIENT
Start: 2024-03-20

## 2024-03-20 RX ORDER — TEMAZEPAM 30 MG/1
30 CAPSULE ORAL NIGHTLY PRN
Qty: 30 CAPSULE | Refills: 5 | Status: SHIPPED | OUTPATIENT
Start: 2024-03-20

## 2024-03-20 NOTE — PROGRESS NOTES
Chief Complaint  Chief Complaint   Patient presents with    Annual Exam       HPI    Patient here for annual physical exam.  Recently lost insurance and is self-pay.    Insomnia/PTSD-  Has been having trouble with sleeping despite continued use of the temazepam nightly. Taking 30 mg nightly. Has been waking nightly 4-5 am for an hour then falls back asleep. This is improved from baseline. Is planning to travel to [place] at the end of the month is concerned with family stressors. His family members trigger a lot of anxiety for him has a history of panic attacks in the past. Generally gets irritable and angry when he suffers from anxiety.  Requesting p.r.n. anxiety medication short term for the trip.  Otherwise anxiety is well controlled other than the upcoming trip.  Has noticed improvement in his anxiety and agitation after starting Cymbalta.  On Cymbalta 30 mg continues to complain of decreased libido, erectile dysfunction, anorgasmia.  Overall mood has improved but suffer side effects.    Asking for nausea medication for his flight. Suffers from motion sickness on cruises.     PAST MEDICAL HISTORY:  Past Medical History:   Diagnosis Date    Allergy     Hyperlipidemia        PAST SURGICAL HISTORY:  Past Surgical History:   Procedure Laterality Date    SINUS SURGERY         SOCIAL HISTORY:  Social History     Socioeconomic History    Marital status: Single   Occupational History    Occupation:    Tobacco Use    Smoking status: Never    Smokeless tobacco: Never   Substance and Sexual Activity    Alcohol use: Yes     Alcohol/week: 2.0 standard drinks of alcohol     Types: 1 Cans of beer, 1 Standard drinks or equivalent per week     Comment: very rarely    Drug use: No    Sexual activity: Yes     Partners: Female     Birth control/protection: Condom       FAMILY HISTORY:  Family History   Problem Relation Age of Onset    Arthritis Mother     Heart disease Maternal Grandmother     Heart disease Maternal  Grandfather     Heart disease Paternal Grandmother     Heart disease Paternal Grandfather        ALLERGIES AND MEDICATIONS: updated and reviewed.  Review of patient's allergies indicates:  No Known Allergies  Current Outpatient Medications   Medication Sig Dispense Refill    azelastine (ASTELIN) 137 mcg (0.1 %) nasal spray 1 spray by Nasal route 2 (two) times daily.      fluticasone propionate (FLOVENT DISKUS) 50 mcg/actuation DsDv Inhale into the lungs. Controller      ALPRAZolam (XANAX) 0.5 MG tablet Take 1 tablet (0.5 mg total) by mouth daily as needed for Anxiety. 15 tablet 0    DULoxetine (CYMBALTA) 30 MG capsule Take 1 capsule (30 mg total) by mouth once daily. 90 capsule 1    gabapentin (NEURONTIN) 300 MG capsule Take 1 capsule by mouth every evening.      ondansetron (ZOFRAN-ODT) 4 MG TbDL Take 1 tablet (4 mg total) by mouth 2 (two) times daily. 15 tablet 0    sildenafiL (VIAGRA) 100 MG tablet Take 1 tablet (100 mg total) by mouth daily as needed for Erectile Dysfunction. 30 tablet 0    temazepam (RESTORIL) 30 mg capsule Take 1 capsule (30 mg total) by mouth nightly as needed for Insomnia. 30 capsule 5     No current facility-administered medications for this visit.         ROS  Review of Systems   Constitutional:  Negative for chills and fever.   HENT:  Negative for ear pain, postnasal drip and sinus pain.    Respiratory:  Negative for cough and shortness of breath.    Cardiovascular:  Negative for chest pain.   Gastrointestinal:  Negative for diarrhea, nausea and vomiting.   Genitourinary:         ED   Psychiatric/Behavioral:  Positive for agitation, dysphoric mood and sleep disturbance. The patient is nervous/anxious.            PHYSICAL EXAM  Vitals:    03/20/24 1500   BP: 110/78   BP Location: Right arm   Patient Position: Sitting   BP Method: Medium (Manual)   Pulse: 94   Resp: 17   Temp: 98.2 °F (36.8 °C)   SpO2: 98%   Weight: 85.3 kg (188 lb 2.6 oz)   Height: 6' (1.829 m)    Body mass index is 25.52  kg/m².  Weight: 85.3 kg (188 lb 2.6 oz)   Height: 6' (182.9 cm)     Physical Exam  Constitutional:       Appearance: He is well-developed.   HENT:      Head: Normocephalic.      Mouth/Throat:      Pharynx: Uvula midline.   Eyes:      Conjunctiva/sclera: Conjunctivae normal.   Cardiovascular:      Rate and Rhythm: Normal rate and regular rhythm.      Pulses: Normal pulses.           Radial pulses are 2+ on the right side and 2+ on the left side.      Heart sounds: Normal heart sounds. No murmur heard.  Pulmonary:      Effort: Pulmonary effort is normal.      Breath sounds: Normal breath sounds. No wheezing.   Abdominal:      General: Bowel sounds are normal.      Palpations: Abdomen is soft.      Tenderness: There is no abdominal tenderness.   Skin:     General: Skin is warm and dry.      Findings: No rash.   Neurological:      Mental Status: He is alert and oriented to person, place, and time.           Health Maintenance         Date Due Completion Date    High Dose Statin Never done ---    Hemoglobin A1c (Diabetic Prevention Screening) Never done ---    Colorectal Cancer Screening Never done ---    Shingles Vaccine (1 of 2) 03/27/2024 (Originally 8/17/2022) ---    COVID-19 Vaccine (1) 03/27/2024 (Originally 2/17/1971) ---    Influenza Vaccine (1) 06/30/2024 (Originally 9/1/2023) 11/23/2015    Lipid Panel 03/27/2028 3/27/2023    TETANUS VACCINE 07/09/2029 7/9/2019              Assessment & Plan    Problem List Items Addressed This Visit          Unprioritized    Hyperlipidemia    Insomnia    Relevant Medications    temazepam (RESTORIL) 30 mg capsule    MDD (major depressive disorder), recurrent episode, moderate    Relevant Medications    temazepam (RESTORIL) 30 mg capsule    PTSD (post-traumatic stress disorder)    Relevant Medications    ALPRAZolam (XANAX) 0.5 MG tablet    temazepam (RESTORIL) 30 mg capsule    DULoxetine (CYMBALTA) 30 MG capsule     Other Visit Diagnoses       Annual physical exam    -  Primary     Drug-induced erectile dysfunction        Relevant Medications    sildenafiL (VIAGRA) 100 MG tablet            Follow-up: Follow up in about 6 months (around 9/20/2024) for follow up with Dr. Covington.    Leila Allen    Medication List with Changes/Refills   New Medications    ALPRAZOLAM (XANAX) 0.5 MG TABLET    Take 1 tablet (0.5 mg total) by mouth daily as needed for Anxiety.    ONDANSETRON (ZOFRAN-ODT) 4 MG TBDL    Take 1 tablet (4 mg total) by mouth 2 (two) times daily.    SILDENAFIL (VIAGRA) 100 MG TABLET    Take 1 tablet (100 mg total) by mouth daily as needed for Erectile Dysfunction.   Current Medications    AZELASTINE (ASTELIN) 137 MCG (0.1 %) NASAL SPRAY    1 spray by Nasal route 2 (two) times daily.    FLUTICASONE PROPIONATE (FLOVENT DISKUS) 50 MCG/ACTUATION DSDV    Inhale into the lungs. Controller    GABAPENTIN (NEURONTIN) 300 MG CAPSULE    Take 1 capsule by mouth every evening.   Changed and/or Refilled Medications    Modified Medication Previous Medication    DULOXETINE (CYMBALTA) 30 MG CAPSULE DULoxetine (CYMBALTA) 30 MG capsule       Take 1 capsule (30 mg total) by mouth once daily.    Take 1 capsule (30 mg total) by mouth once daily.    TEMAZEPAM (RESTORIL) 30 MG CAPSULE temazepam (RESTORIL) 30 mg capsule       Take 1 capsule (30 mg total) by mouth nightly as needed for Insomnia.    Take 1 capsule (30 mg total) by mouth nightly as needed.   Discontinued Medications    IBUPROFEN (ADVIL,MOTRIN) 800 MG TABLET    Take 1 tablet (800 mg total) by mouth every 6 (six) hours as needed for Pain. Take with food

## 2024-03-20 NOTE — TELEPHONE ENCOUNTER
----- Message from Osman Morales sent at 3/20/2024  3:39 PM CDT -----  Contact: Pt   Pharmacy is calling to clarify an RX.  RX name:  ALPRAZolam (XANAX) 0.5 MG tablet and temazepam (RESTORIL) 30 mg capsule  What do they need to clarify: Pharmacy want to make sure ok that pt has been prescribed both medications       [facility] [address] [facility] [place] [facility] [address]   Phone: [phone]  Fax: [phone]        Comments:

## 2024-05-14 DIAGNOSIS — F43.10 PTSD (POST-TRAUMATIC STRESS DISORDER): ICD-10-CM

## 2024-05-15 RX ORDER — ALPRAZOLAM 0.5 MG/1
TABLET ORAL
Qty: 15 TABLET | Refills: 0 | Status: SHIPPED | OUTPATIENT
Start: 2024-05-15

## 2024-09-04 DIAGNOSIS — F43.10 PTSD (POST-TRAUMATIC STRESS DISORDER): ICD-10-CM

## 2024-09-04 DIAGNOSIS — F51.01 PRIMARY INSOMNIA: ICD-10-CM

## 2024-09-04 DIAGNOSIS — F33.1 MDD (MAJOR DEPRESSIVE DISORDER), RECURRENT EPISODE, MODERATE: ICD-10-CM

## 2024-09-04 RX ORDER — TEMAZEPAM 30 MG/1
30 CAPSULE ORAL NIGHTLY PRN
Qty: 30 CAPSULE | Refills: 0 | Status: SHIPPED | OUTPATIENT
Start: 2024-09-04

## 2024-09-04 NOTE — TELEPHONE ENCOUNTER
No care due was identified.  [facility] Embedded Care Due Messages. Reference number: [id number].   9/04/2024 11:55:12 AM CDT

## 2024-09-04 NOTE — TELEPHONE ENCOUNTER
----- Message from Brijesh Whitley sent at 9/4/2024 11:05 AM CDT -----  Regarding: Refill Request  Contact: Pt [phone]  Requesting an RX refill or new RX.    Is this a refill or new RX: refill    RX name and strength (copy/paste from chart):  temazepam (RESTORIL) 30 mg capsule    Is this a 30 day or 90 day RX:     Pharmacy name and phone # (copy/paste from chart):      [address] [place]  [address] [facility]  [address] [facility]  [place] [address]  Phone: [phone] Fax: [phone]       Comments: Patient just used his last RX and wanted to make sure another was put in

## 2024-09-09 ENCOUNTER — PATIENT OUTREACH (OUTPATIENT)
Dept: ADMINISTRATIVE | Facility: HOSPITAL | Age: 54
End: 2024-09-09

## 2024-09-09 NOTE — PROGRESS NOTES
Health Maintenance Due   Topic Date Due    Hemoglobin A1c (Diabetic Prevention Screening)  Never done    Colorectal Cancer Screening  Never done    Shingles Vaccine (1 of 2) Never done    Influenza Vaccine (1) 09/01/2024    COVID-19 Vaccine (1 - 2023-24 season) Never done        Chart review done.    updated.   Immunizations reviewed & updated.   Care Everywhere updated.

## 2024-09-19 ENCOUNTER — PATIENT MESSAGE (OUTPATIENT)
Dept: PRIMARY CARE CLINIC | Facility: CLINIC | Age: 54
End: 2024-09-19

## 2024-09-23 ENCOUNTER — OFFICE VISIT (OUTPATIENT)
Dept: PRIMARY CARE CLINIC | Facility: CLINIC | Age: 54
End: 2024-09-23

## 2024-09-23 VITALS
BODY MASS INDEX: 24.71 KG/M2 | SYSTOLIC BLOOD PRESSURE: 118 MMHG | OXYGEN SATURATION: 99 % | DIASTOLIC BLOOD PRESSURE: 72 MMHG | HEIGHT: 72 IN | HEART RATE: 70 BPM | RESPIRATION RATE: 20 BRPM | WEIGHT: 182.44 LBS | TEMPERATURE: 98 F

## 2024-09-23 DIAGNOSIS — Z12.12 ENCOUNTER FOR COLORECTAL CANCER SCREENING: ICD-10-CM

## 2024-09-23 DIAGNOSIS — N52.2 DRUG-INDUCED ERECTILE DYSFUNCTION: ICD-10-CM

## 2024-09-23 DIAGNOSIS — F51.04 CHRONIC INSOMNIA: Primary | ICD-10-CM

## 2024-09-23 DIAGNOSIS — F43.10 PTSD (POST-TRAUMATIC STRESS DISORDER): ICD-10-CM

## 2024-09-23 DIAGNOSIS — Z12.11 ENCOUNTER FOR COLORECTAL CANCER SCREENING: ICD-10-CM

## 2024-09-23 PROCEDURE — 99999 PR PBB SHADOW E&M-EST. PATIENT-LVL III: CPT | Mod: PBBFAC,,, | Performed by: FAMILY MEDICINE

## 2024-09-23 PROCEDURE — 99213 OFFICE O/P EST LOW 20 MIN: CPT | Mod: PBBFAC,PN | Performed by: FAMILY MEDICINE

## 2024-09-23 PROCEDURE — 99214 OFFICE O/P EST MOD 30 MIN: CPT | Mod: S$PBB,,, | Performed by: FAMILY MEDICINE

## 2024-09-23 RX ORDER — TEMAZEPAM 15 MG/1
15 CAPSULE ORAL NIGHTLY PRN
Qty: 30 CAPSULE | Refills: 5 | Status: SHIPPED | OUTPATIENT
Start: 2024-09-23

## 2024-09-23 RX ORDER — DULOXETIN HYDROCHLORIDE 30 MG/1
30 CAPSULE, DELAYED RELEASE ORAL DAILY
Qty: 90 CAPSULE | Refills: 3 | Status: SHIPPED | OUTPATIENT
Start: 2024-09-23

## 2024-09-23 RX ORDER — SILDENAFIL 100 MG/1
100 TABLET, FILM COATED ORAL DAILY PRN
Qty: 30 TABLET | Refills: 11 | Status: SHIPPED | OUTPATIENT
Start: 2024-09-23

## 2024-09-23 RX ORDER — ALPRAZOLAM 0.5 MG/1
TABLET ORAL
Qty: 15 TABLET | Refills: 0 | Status: CANCELLED | OUTPATIENT
Start: 2024-09-23

## 2024-09-23 RX ORDER — TRAZODONE HYDROCHLORIDE 50 MG/1
50-100 TABLET ORAL NIGHTLY PRN
Qty: 60 TABLET | Refills: 5 | Status: SHIPPED | OUTPATIENT
Start: 2024-09-23

## 2024-09-23 NOTE — PROGRESS NOTES
Subjective:       Patient ID: Jonathan Srinivasan is a 54 y.o. male.    Chief Complaint: Follow-up (6 month/reg check up.)    Here for regular checkup and refills.  Has struggled with insomnia for many years.  Has been on temazepam for years.  No adverse side effects, no daytime somnolence, etc..  However, he indicates that he would prefer not to be on habit-forming medications if at all possible.    Follow-up  Pertinent negatives include no abdominal pain, arthralgias, chest pain, chills, congestion, coughing, fatigue, fever, nausea, rash or vomiting.     Review of Systems   Constitutional:  Negative for chills, fatigue and fever.   HENT:  Negative for congestion.    Eyes:  Negative for visual disturbance.   Respiratory:  Negative for cough and shortness of breath.    Cardiovascular:  Negative for chest pain.   Gastrointestinal:  Negative for abdominal pain, nausea and vomiting.   Genitourinary:  Negative for difficulty urinating.   Musculoskeletal:  Negative for arthralgias.   Skin:  Negative for rash.   Neurological:  Negative for dizziness.   Psychiatric/Behavioral:  Positive for sleep disturbance. The patient is nervous/anxious.        Objective:      Vitals:    09/23/24 0904   BP: 118/72   BP Location: Left arm   Patient Position: Sitting   BP Method: Medium (Manual)   Pulse: 70   Resp: 20   Temp: 98 °F (36.7 °C)   TempSrc: Oral   SpO2: 99%   Weight: 82.7 kg (182 lb 6.9 oz)   Height: 6' (1.829 m)     BP Readings from Last 5 Encounters:   09/23/24 118/72   03/20/24 110/78   10/23/23 131/80   09/26/23 121/77   04/27/23 102/62     Wt Readings from Last 5 Encounters:   09/23/24 82.7 kg (182 lb 6.9 oz)   03/20/24 85.3 kg (188 lb 2.6 oz)   10/23/23 81.8 kg (180 lb 3.6 oz)   09/26/23 82.1 kg (180 lb 14.2 oz)   04/27/23 84.8 kg (186 lb 13.4 oz)     Physical Exam  Vitals and nursing note reviewed.   Constitutional:       General: He is not in acute distress.     Appearance: Normal appearance. He is well-developed.    HENT:      Head: Normocephalic and atraumatic.   Cardiovascular:      Rate and Rhythm: Normal rate and regular rhythm.      Heart sounds: Normal heart sounds.   Pulmonary:      Effort: Pulmonary effort is normal.      Breath sounds: Normal breath sounds.   Musculoskeletal:      Right lower leg: No edema.      Left lower leg: No edema.   Skin:     General: Skin is warm and dry.   Neurological:      Mental Status: He is alert and oriented to person, place, and time.   Psychiatric:         Mood and Affect: Mood normal.         Behavior: Behavior normal.         Lab Results   Component Value Date    WBC 3.97 03/27/2023    HGB 15.2 03/27/2023    HCT 46.8 03/27/2023     03/27/2023    CHOL 234 (H) 03/27/2023    TRIG 123 03/27/2023    HDL 47 03/27/2023    ALT 18 03/27/2023    AST 19 03/27/2023     03/27/2023    K 5.3 (H) 03/27/2023     03/27/2023    CREATININE 0.9 03/27/2023    BUN 15 03/27/2023    CO2 25 03/27/2023    TSH 1.076 03/27/2023    PSA 0.48 03/27/2023      Assessment:       1. Chronic insomnia    2. PTSD (post-traumatic stress disorder)    3. Drug-induced erectile dysfunction    4. Encounter for colorectal cancer screening        Plan:       Chronic insomnia  -     temazepam (RESTORIL) 15 mg Cap; Take 1 capsule (15 mg total) by mouth nightly as needed for Insomnia.  Dispense: 30 capsule; Refill: 5  -     traZODone (DESYREL) 50 MG tablet; Take 1-2 tablets ( mg total) by mouth nightly as needed for Insomnia.  Dispense: 60 tablet; Refill: 5  Decrease temazepam and add low-dose trazodone as needed  PTSD (post-traumatic stress disorder)  -     DULoxetine (CYMBALTA) 30 MG capsule; Take 1 capsule (30 mg total) by mouth once daily.  Dispense: 90 capsule; Refill: 3  Continue duloxetine  Drug-induced erectile dysfunction  -     sildenafiL (VIAGRA) 100 MG tablet; Take 1 tablet (100 mg total) by mouth daily as needed for Erectile Dysfunction.  Dispense: 30 tablet; Refill: 11    Encounter for  colorectal cancer screening  -     Fecal Immunochemical Test (iFOBT); Future; Expected date: 09/23/2024      Medication List with Changes/Refills   New Medications    TRAZODONE (DESYREL) 50 MG TABLET    Take 1-2 tablets ( mg total) by mouth nightly as needed for Insomnia.   Changed and/or Refilled Medications    Modified Medication Previous Medication    DULOXETINE (CYMBALTA) 30 MG CAPSULE DULoxetine (CYMBALTA) 30 MG capsule       Take 1 capsule (30 mg total) by mouth once daily.    Take 1 capsule (30 mg total) by mouth once daily.    SILDENAFIL (VIAGRA) 100 MG TABLET sildenafiL (VIAGRA) 100 MG tablet       Take 1 tablet (100 mg total) by mouth daily as needed for Erectile Dysfunction.    Take 1 tablet (100 mg total) by mouth daily as needed for Erectile Dysfunction.    TEMAZEPAM (RESTORIL) 15 MG CAP temazepam (RESTORIL) 30 mg capsule       Take 1 capsule (15 mg total) by mouth nightly as needed for Insomnia.    Take 1 capsule (30 mg total) by mouth nightly as needed for Insomnia.   Discontinued Medications    ALPRAZOLAM (XANAX) 0.5 MG TABLET    TAKE 1 TABLET BY MOUTH ONCE DAILY AS NEEDED FOR ANXIETY    AZELASTINE (ASTELIN) 137 MCG (0.1 %) NASAL SPRAY    1 spray by Nasal route 2 (two) times daily.    FLUTICASONE PROPIONATE (FLOVENT DISKUS) 50 MCG/ACTUATION DSDV    Inhale into the lungs. Controller    GABAPENTIN (NEURONTIN) 300 MG CAPSULE    Take 1 capsule by mouth every evening.    ONDANSETRON (ZOFRAN-ODT) 4 MG TBDL    Take 1 tablet (4 mg total) by mouth 2 (two) times daily.

## 2024-10-08 ENCOUNTER — LAB VISIT (OUTPATIENT)
Dept: LAB | Facility: HOSPITAL | Age: 54
End: 2024-10-08
Attending: FAMILY MEDICINE

## 2024-10-08 DIAGNOSIS — Z12.11 ENCOUNTER FOR COLORECTAL CANCER SCREENING: ICD-10-CM

## 2024-10-08 DIAGNOSIS — Z12.12 ENCOUNTER FOR COLORECTAL CANCER SCREENING: ICD-10-CM

## 2024-10-08 LAB — HEMOCCULT STL QL IA: NEGATIVE

## 2024-10-08 PROCEDURE — 82274 ASSAY TEST FOR BLOOD FECAL: CPT | Performed by: FAMILY MEDICINE

## 2025-03-20 ENCOUNTER — TELEPHONE (OUTPATIENT)
Dept: PRIMARY CARE CLINIC | Facility: CLINIC | Age: 55
End: 2025-03-20

## 2025-03-20 NOTE — TELEPHONE ENCOUNTER
----- Message from Pattie sent at 3/20/2025  9:47 AM CDT -----  Please give patient a call due to he has questions regarding his appt on March 24. [phone]